# Patient Record
Sex: FEMALE | Race: WHITE | Employment: OTHER | URBAN - METROPOLITAN AREA
[De-identification: names, ages, dates, MRNs, and addresses within clinical notes are randomized per-mention and may not be internally consistent; named-entity substitution may affect disease eponyms.]

---

## 2017-08-09 ENCOUNTER — LAB ENCOUNTER (OUTPATIENT)
Dept: LAB | Age: 62
End: 2017-08-09
Attending: FAMILY MEDICINE
Payer: OTHER GOVERNMENT

## 2017-08-09 ENCOUNTER — OFFICE VISIT (OUTPATIENT)
Dept: FAMILY MEDICINE CLINIC | Facility: CLINIC | Age: 62
End: 2017-08-09

## 2017-08-09 VITALS
HEIGHT: 67 IN | DIASTOLIC BLOOD PRESSURE: 68 MMHG | HEART RATE: 58 BPM | SYSTOLIC BLOOD PRESSURE: 102 MMHG | WEIGHT: 171 LBS | TEMPERATURE: 98 F | BODY MASS INDEX: 26.84 KG/M2

## 2017-08-09 DIAGNOSIS — Z80.0 FH: COLON CANCER: ICD-10-CM

## 2017-08-09 DIAGNOSIS — R79.89 D-DIMER, ELEVATED: ICD-10-CM

## 2017-08-09 DIAGNOSIS — Z80.3 FH: BREAST CANCER: ICD-10-CM

## 2017-08-09 DIAGNOSIS — D18.03 LIVER HEMANGIOMA: ICD-10-CM

## 2017-08-09 DIAGNOSIS — Z90.710 HISTORY OF HYSTERECTOMY FOR BENIGN DISEASE: ICD-10-CM

## 2017-08-09 DIAGNOSIS — R79.89 D-DIMER, ELEVATED: Primary | ICD-10-CM

## 2017-08-09 LAB
ALBUMIN SERPL BCP-MCNC: 4.3 G/DL (ref 3.5–4.8)
ALBUMIN/GLOB SERPL: 1.7 {RATIO} (ref 1–2)
ALP SERPL-CCNC: 53 U/L (ref 32–100)
ALT SERPL-CCNC: 13 U/L (ref 14–54)
ANION GAP SERPL CALC-SCNC: 6 MMOL/L (ref 0–18)
AST SERPL-CCNC: 22 U/L (ref 15–41)
BASOPHILS # BLD: 0.1 K/UL (ref 0–0.2)
BASOPHILS NFR BLD: 1 %
BILIRUB SERPL-MCNC: 0.5 MG/DL (ref 0.3–1.2)
BUN SERPL-MCNC: 15 MG/DL (ref 8–20)
BUN/CREAT SERPL: 17.2 (ref 10–20)
CALCIUM SERPL-MCNC: 9.6 MG/DL (ref 8.5–10.5)
CHLORIDE SERPL-SCNC: 107 MMOL/L (ref 95–110)
CO2 SERPL-SCNC: 28 MMOL/L (ref 22–32)
CREAT SERPL-MCNC: 0.87 MG/DL (ref 0.5–1.5)
D DIMER PPP FEU-MCNC: 0.91 MCG/ML (ref ?–0.61)
EOSINOPHIL # BLD: 0.1 K/UL (ref 0–0.7)
EOSINOPHIL NFR BLD: 1 %
ERYTHROCYTE [DISTWIDTH] IN BLOOD BY AUTOMATED COUNT: 12.5 % (ref 11–15)
GLOBULIN PLAS-MCNC: 2.5 G/DL (ref 2.5–3.7)
GLUCOSE SERPL-MCNC: 95 MG/DL (ref 70–99)
HCT VFR BLD AUTO: 41.2 % (ref 35–48)
HGB BLD-MCNC: 13.8 G/DL (ref 12–16)
LYMPHOCYTES # BLD: 2.4 K/UL (ref 1–4)
LYMPHOCYTES NFR BLD: 37 %
MCH RBC QN AUTO: 29.9 PG (ref 27–32)
MCHC RBC AUTO-ENTMCNC: 33.4 G/DL (ref 32–37)
MCV RBC AUTO: 89.6 FL (ref 80–100)
MONOCYTES # BLD: 0.5 K/UL (ref 0–1)
MONOCYTES NFR BLD: 8 %
NEUTROPHILS # BLD AUTO: 3.6 K/UL (ref 1.8–7.7)
NEUTROPHILS NFR BLD: 53 %
OSMOLALITY UR CALC.SUM OF ELEC: 293 MOSM/KG (ref 275–295)
PLATELET # BLD AUTO: 182 K/UL (ref 140–400)
PMV BLD AUTO: 8.5 FL (ref 7.4–10.3)
POTASSIUM SERPL-SCNC: 4.8 MMOL/L (ref 3.3–5.1)
PROT SERPL-MCNC: 6.8 G/DL (ref 5.9–8.4)
RBC # BLD AUTO: 4.6 M/UL (ref 3.7–5.4)
SODIUM SERPL-SCNC: 141 MMOL/L (ref 136–144)
WBC # BLD AUTO: 6.7 K/UL (ref 4–11)

## 2017-08-09 PROCEDURE — 36415 COLL VENOUS BLD VENIPUNCTURE: CPT

## 2017-08-09 PROCEDURE — 80053 COMPREHEN METABOLIC PANEL: CPT

## 2017-08-09 PROCEDURE — 85025 COMPLETE CBC W/AUTO DIFF WBC: CPT

## 2017-08-09 PROCEDURE — 99203 OFFICE O/P NEW LOW 30 MIN: CPT | Performed by: FAMILY MEDICINE

## 2017-08-09 PROCEDURE — 99212 OFFICE O/P EST SF 10 MIN: CPT | Performed by: FAMILY MEDICINE

## 2017-08-09 PROCEDURE — 85379 FIBRIN DEGRADATION QUANT: CPT

## 2017-08-09 RX ORDER — MULTIVITAMIN
1 TABLET ORAL DAILY
Status: ON HOLD | COMMUNITY
End: 2019-12-24

## 2017-08-09 RX ORDER — ASPIRIN 81 MG/1
TABLET, CHEWABLE ORAL DAILY
COMMUNITY

## 2017-08-09 NOTE — PROGRESS NOTES
HPI:    Patient ID: Consuello Jeans is a 65year old female. HPI     Patient is here new to establish care. [place] ago from [place]. She has recently  about 4 years ago. She has a grown son who lives in [place]. Musculoskeletal: She exhibits no edema or tenderness. Skin: No rash noted.               ASSESSMENT/PLAN:   D-dimer, elevated  (primary encounter diagnosis)  Liver hemangioma  History of hysterectomy for benign disease  Fh: colon cancer  Fh: breast canc

## 2017-08-15 ENCOUNTER — TELEPHONE (OUTPATIENT)
Dept: INTERNAL MEDICINE CLINIC | Facility: CLINIC | Age: 62
End: 2017-08-15

## 2017-08-15 NOTE — TELEPHONE ENCOUNTER
----- Message from Zoila Rios MD sent at 8/11/2017 10:02 PM CDT -----  D dimer remains slightly elevated. Will refer to hematology (Dr Sissy Edward).  Need prior records from hematologist

## 2017-08-28 NOTE — TELEPHONE ENCOUNTER
Pt called, message per Dr given.   Verbalized understanding and compliance  Has appt pending 8/31/17

## 2017-08-31 ENCOUNTER — OFFICE VISIT (OUTPATIENT)
Dept: FAMILY MEDICINE CLINIC | Facility: CLINIC | Age: 62
End: 2017-08-31

## 2017-08-31 VITALS
DIASTOLIC BLOOD PRESSURE: 63 MMHG | HEART RATE: 69 BPM | BODY MASS INDEX: 27 KG/M2 | SYSTOLIC BLOOD PRESSURE: 97 MMHG | WEIGHT: 171 LBS

## 2017-08-31 DIAGNOSIS — Q74.0 ABNORMAL PROMINENCE OF CLAVICLE: Primary | ICD-10-CM

## 2017-08-31 DIAGNOSIS — L81.9 ATYPICAL PIGMENTED SKIN LESION: ICD-10-CM

## 2017-08-31 PROCEDURE — 99214 OFFICE O/P EST MOD 30 MIN: CPT | Performed by: FAMILY MEDICINE

## 2017-08-31 PROCEDURE — 99212 OFFICE O/P EST SF 10 MIN: CPT | Performed by: FAMILY MEDICINE

## 2017-08-31 NOTE — PROGRESS NOTES
HPI:    Patient ID: Enrico Castro is a 65year old female. HPI     Patient presents with:  Bump: Pt has a bump/lump on the chest/collar bone. Pt states she noticed it 1 wk ago. She denies pain . No breast mass  No injury  Does yoga.         Review

## 2017-09-01 ENCOUNTER — HOSPITAL ENCOUNTER (OUTPATIENT)
Dept: GENERAL RADIOLOGY | Facility: HOSPITAL | Age: 62
Discharge: HOME | End: 2017-09-01
Attending: FAMILY MEDICINE
Payer: OTHER GOVERNMENT

## 2017-09-01 DIAGNOSIS — Q74.0 ABNORMAL PROMINENCE OF CLAVICLE: ICD-10-CM

## 2017-09-01 PROCEDURE — 73000 X-RAY EXAM OF COLLAR BONE: CPT | Performed by: FAMILY MEDICINE

## 2017-10-23 ENCOUNTER — OFFICE VISIT (OUTPATIENT)
Dept: DERMATOLOGY CLINIC | Facility: CLINIC | Age: 62
End: 2017-10-23

## 2017-10-23 DIAGNOSIS — D23.4 BENIGN NEOPLASM OF SCALP AND SKIN OF NECK: ICD-10-CM

## 2017-10-23 DIAGNOSIS — L81.4 SOLAR LENTIGO: ICD-10-CM

## 2017-10-23 DIAGNOSIS — L57.8 SUN-DAMAGED SKIN: ICD-10-CM

## 2017-10-23 DIAGNOSIS — L82.1 SEBORRHEIC KERATOSES: Primary | ICD-10-CM

## 2017-10-23 DIAGNOSIS — D23.70 BENIGN NEOPLASM OF SKIN OF LOWER EXTREMITY, INCLUDING HIP, UNSPECIFIED LATERALITY: ICD-10-CM

## 2017-10-23 DIAGNOSIS — L60.9 DISEASE OF NAIL: ICD-10-CM

## 2017-10-23 DIAGNOSIS — D23.30 BENIGN NEOPLASM OF SKIN OF FACE: ICD-10-CM

## 2017-10-23 DIAGNOSIS — D23.60 BENIGN NEOPLASM OF SKIN OF UPPER EXTREMITY AND SHOULDER, UNSPECIFIED LATERALITY: ICD-10-CM

## 2017-10-23 DIAGNOSIS — D23.5 BENIGN NEOPLASM OF SKIN OF TRUNK, EXCEPT SCROTUM: ICD-10-CM

## 2017-10-23 DIAGNOSIS — L91.8 SKIN TAG: ICD-10-CM

## 2017-10-23 PROCEDURE — 99213 OFFICE O/P EST LOW 20 MIN: CPT | Performed by: DERMATOLOGY

## 2017-10-23 PROCEDURE — 99202 OFFICE O/P NEW SF 15 MIN: CPT | Performed by: DERMATOLOGY

## 2018-01-24 ENCOUNTER — OFFICE VISIT (OUTPATIENT)
Dept: INTERNAL MEDICINE CLINIC | Facility: CLINIC | Age: 63
End: 2018-01-24

## 2018-01-24 ENCOUNTER — NURSE TRIAGE (OUTPATIENT)
Dept: OTHER | Age: 63
End: 2018-01-24

## 2018-01-24 VITALS
HEIGHT: 67 IN | HEART RATE: 118 BPM | SYSTOLIC BLOOD PRESSURE: 117 MMHG | BODY MASS INDEX: 27.06 KG/M2 | WEIGHT: 172.38 LBS | TEMPERATURE: 98 F | DIASTOLIC BLOOD PRESSURE: 76 MMHG

## 2018-01-24 DIAGNOSIS — L03.032 INFECTION OF NAIL BED OF TOE OF LEFT FOOT: Primary | ICD-10-CM

## 2018-01-24 PROCEDURE — 99213 OFFICE O/P EST LOW 20 MIN: CPT | Performed by: INTERNAL MEDICINE

## 2018-01-24 PROCEDURE — 99212 OFFICE O/P EST SF 10 MIN: CPT | Performed by: INTERNAL MEDICINE

## 2018-01-24 RX ORDER — CLINDAMYCIN HYDROCHLORIDE 300 MG/1
300 CAPSULE ORAL 3 TIMES DAILY
Qty: 21 CAPSULE | Refills: 0 | Status: SHIPPED | OUTPATIENT
Start: 2018-01-24 | End: 2018-01-31

## 2018-01-24 NOTE — TELEPHONE ENCOUNTER
Action Requested: Summary for Provider     []  Critical Lab, Recommendations Needed  [] Need Additional Advice  [x]   FYI    []   Need Orders  [] Need Medications Sent to Pharmacy  []  Other     SUMMARY:  Left big toe nail fell off 5 days ago and since the

## 2018-01-24 NOTE — PROGRESS NOTES
HPI:    Patient ID: Erick Martínez is a 65year old female. HPI  Here for left big toenail infection. She reports her toenail fell off 5 days ago. She has history of onychomycosis of the toenail. It appears red, swollen and is painful.   There is no

## 2018-02-15 ENCOUNTER — OFFICE VISIT (OUTPATIENT)
Dept: PODIATRY CLINIC | Facility: CLINIC | Age: 63
End: 2018-02-15

## 2018-02-15 DIAGNOSIS — B35.1 ONYCHOMYCOSIS: Primary | ICD-10-CM

## 2018-02-15 PROCEDURE — 99202 OFFICE O/P NEW SF 15 MIN: CPT | Performed by: PODIATRIST

## 2018-02-15 PROCEDURE — 99213 OFFICE O/P EST LOW 20 MIN: CPT | Performed by: PODIATRIST

## 2018-02-15 NOTE — PROGRESS NOTES
HPI:    Patient ID: Lisandra Flores is a 65year old female. HPI  This 52-year-old female presents as a new patient to me and states that she is self-referred. She is extremely frustrated with nail changes that are progressive.   They have been this way

## 2018-02-26 ENCOUNTER — TELEPHONE (OUTPATIENT)
Dept: PODIATRY CLINIC | Facility: CLINIC | Age: 63
End: 2018-02-26

## 2018-02-26 NOTE — TELEPHONE ENCOUNTER
----- Message from Eneida Troy DPM sent at 2/26/2018 11:44 AM CST -----  Please call this patient and notify them of the need for an office visit to the review fungus culture results thank you

## 2018-04-19 ENCOUNTER — APPOINTMENT (OUTPATIENT)
Dept: LAB | Age: 63
End: 2018-04-19
Attending: PODIATRIST
Payer: OTHER GOVERNMENT

## 2018-04-19 ENCOUNTER — OFFICE VISIT (OUTPATIENT)
Dept: PODIATRY CLINIC | Facility: CLINIC | Age: 63
End: 2018-04-19

## 2018-04-19 DIAGNOSIS — B35.1 ONYCHOMYCOSIS: ICD-10-CM

## 2018-04-19 DIAGNOSIS — B35.1 ONYCHOMYCOSIS: Primary | ICD-10-CM

## 2018-04-19 PROCEDURE — 99212 OFFICE O/P EST SF 10 MIN: CPT | Performed by: PODIATRIST

## 2018-04-19 PROCEDURE — 36415 COLL VENOUS BLD VENIPUNCTURE: CPT

## 2018-04-19 PROCEDURE — 80076 HEPATIC FUNCTION PANEL: CPT

## 2018-04-19 RX ORDER — OMEPRAZOLE 20 MG/1
20 CAPSULE, DELAYED RELEASE ORAL
Status: ON HOLD | COMMUNITY
End: 2019-12-24

## 2018-04-19 NOTE — PROGRESS NOTES
HPI:    Patient ID: Lisandra Flores is a 65year old female. HPI  This 28-year-old female presents to discuss care associated with her fungus toenails. Fungal culture demonstrated thetricophyton issues.   Review of Systems  I reviewed medical status, me

## 2018-04-19 NOTE — TELEPHONE ENCOUNTER
Please sign Lamisil order      Call to Manda  Discussed her liver function tests and starting of Lamisil  Discussed taking Lamasil for 3 months  Discussed seeing Dr. Janis Mckinnon for  2 months  Verbalizes understanding.      [address]

## 2018-04-20 RX ORDER — TERBINAFINE HYDROCHLORIDE 250 MG/1
250 TABLET ORAL DAILY
Qty: 90 TABLET | Refills: 0 | Status: SHIPPED | OUTPATIENT
Start: 2018-04-20 | End: 2018-08-02 | Stop reason: ALTCHOICE

## 2018-08-02 ENCOUNTER — OFFICE VISIT (OUTPATIENT)
Dept: PODIATRY CLINIC | Facility: CLINIC | Age: 63
End: 2018-08-02
Payer: OTHER GOVERNMENT

## 2018-08-02 DIAGNOSIS — B35.1 ONYCHOMYCOSIS: Primary | ICD-10-CM

## 2018-08-02 PROCEDURE — 99212 OFFICE O/P EST SF 10 MIN: CPT | Performed by: PODIATRIST

## 2018-08-02 NOTE — PROGRESS NOTES
HPI:    Patient ID: Sarah Silva is a 59year old female. HPI  60-year-old female presents post oral Lamisil. Patient feels as though her nails are clear and have responded to treatment but not completely resolved.   She reports no ill effects with t

## 2019-10-02 ENCOUNTER — OFFICE VISIT (OUTPATIENT)
Dept: GASTROENTEROLOGY | Facility: CLINIC | Age: 64
End: 2019-10-02
Payer: OTHER GOVERNMENT

## 2019-10-02 ENCOUNTER — TELEPHONE (OUTPATIENT)
Dept: GASTROENTEROLOGY | Facility: CLINIC | Age: 64
End: 2019-10-02

## 2019-10-02 VITALS
WEIGHT: 166.38 LBS | SYSTOLIC BLOOD PRESSURE: 108 MMHG | DIASTOLIC BLOOD PRESSURE: 76 MMHG | BODY MASS INDEX: 26.11 KG/M2 | HEART RATE: 67 BPM | HEIGHT: 67 IN

## 2019-10-02 DIAGNOSIS — K59.04 CHRONIC IDIOPATHIC CONSTIPATION: ICD-10-CM

## 2019-10-02 DIAGNOSIS — Z12.11 COLON CANCER SCREENING: Primary | ICD-10-CM

## 2019-10-02 DIAGNOSIS — Z80.0 FAMILY HISTORY OF COLON CANCER REQUIRING SCREENING COLONOSCOPY: ICD-10-CM

## 2019-10-02 DIAGNOSIS — D18.03 LIVER HEMANGIOMA: Primary | ICD-10-CM

## 2019-10-02 DIAGNOSIS — Z80.0 FAMILY HISTORY OF COLON CANCER: ICD-10-CM

## 2019-10-02 PROCEDURE — 99203 OFFICE O/P NEW LOW 30 MIN: CPT | Performed by: INTERNAL MEDICINE

## 2019-10-02 RX ORDER — POLYETHYLENE GLYCOL 3350, SODIUM CHLORIDE, POTASSIUM CHLORIDE, SODIUM BICARBONATE, AND SODIUM SULFATE 240; 5.84; 2.98; 6.72; 22.72 G/4L; G/4L; G/4L; G/4L; G/4L
POWDER, FOR SOLUTION ORAL
Qty: 1 BOTTLE | Refills: 0 | Status: SHIPPED | OUTPATIENT
Start: 2019-10-02 | End: 2021-04-20

## 2019-10-02 NOTE — TELEPHONE ENCOUNTER
Scheduled for:  Colonoscopy 10681  Provider Name: Roseanna Stuart  Date:  12/24/19   Location:  [facility]  Sedation:  Mac  Time:  0730 Vidya Splinter 0630  Prep: Golytely  Meds/Allergies Reconciled?:  Physician reviewed  Diagnosis with codes:  Colon Cancer screening Z12.11 ,

## 2019-10-02 NOTE — PATIENT INSTRUCTIONS
1.  Continue MIRALAX, could also ALOE VERA natural laxative (from health food stores such as [employer] in [place]) or laxative teas or MILK OF MAGNESIA (laxative section of any pharmacy)    2.   Schedule colonoscopy exam at [facility]

## 2019-10-02 NOTE — PROGRESS NOTES
HPI:    Patient ID: Lorena Saenz is a 62year old woman overall healthy with recent minor concerns of a thrombosis event, kidney lesions, reported hepatic hemangiomas.     Ms. Tamia Ingram comes in today to discuss colonoscopy examination for her family histor Multiple Vitamin Oral Tab Take 1 tablet by mouth daily. Disp:  Rfl:      Allergies:  Penicillins             RASH  Streptomycin            RASH  Other                       Comment:levomicetine  Imaging: No results found.      PHYSICAL EXAM:   Physical Ex reports available for my review. Could bring the hepatic hemangiomas to our attention for further imaging and follow-up in our system if she wishes. No orders of the defined types were placed in this encounter.       Meds This Visit:  Requested Pres

## 2019-12-20 ENCOUNTER — TELEPHONE (OUTPATIENT)
Dept: GASTROENTEROLOGY | Facility: CLINIC | Age: 64
End: 2019-12-20

## 2019-12-20 NOTE — TELEPHONE ENCOUNTER
I spoke to the pt's     He wanted to verify the pt's appt date and time. He also wanted to go over the times to drink the prep.     I answered all of their questions and they verbalized understanding    6:30 am arrival time  First half of prep on

## 2019-12-24 ENCOUNTER — ANESTHESIA EVENT (OUTPATIENT)
Dept: ENDOSCOPY | Facility: HOSPITAL | Age: 64
End: 2019-12-24
Payer: OTHER GOVERNMENT

## 2019-12-24 ENCOUNTER — HOSPITAL ENCOUNTER (OUTPATIENT)
Facility: HOSPITAL | Age: 64
Setting detail: HOSPITAL OUTPATIENT SURGERY
Discharge: HOME | End: 2019-12-24
Attending: INTERNAL MEDICINE | Admitting: INTERNAL MEDICINE
Payer: OTHER GOVERNMENT

## 2019-12-24 ENCOUNTER — ANESTHESIA (OUTPATIENT)
Dept: ENDOSCOPY | Facility: HOSPITAL | Age: 64
End: 2019-12-24
Payer: OTHER GOVERNMENT

## 2019-12-24 DIAGNOSIS — Z80.0 FAMILY HISTORY OF COLON CANCER: ICD-10-CM

## 2019-12-24 DIAGNOSIS — Z12.11 COLON CANCER SCREENING: ICD-10-CM

## 2019-12-24 PROCEDURE — 0DBH8ZX EXCISION OF CECUM, VIA NATURAL OR ARTIFICIAL OPENING ENDOSCOPIC, DIAGNOSTIC: ICD-10-PCS | Performed by: INTERNAL MEDICINE

## 2019-12-24 PROCEDURE — 45380 COLONOSCOPY AND BIOPSY: CPT | Performed by: INTERNAL MEDICINE

## 2019-12-24 RX ORDER — MIDAZOLAM HYDROCHLORIDE 1 MG/ML
INJECTION INTRAMUSCULAR; INTRAVENOUS AS NEEDED
Status: DISCONTINUED | OUTPATIENT
Start: 2019-12-24 | End: 2019-12-24 | Stop reason: SURG

## 2019-12-24 RX ORDER — SODIUM CHLORIDE, SODIUM LACTATE, POTASSIUM CHLORIDE, CALCIUM CHLORIDE 600; 310; 30; 20 MG/100ML; MG/100ML; MG/100ML; MG/100ML
INJECTION, SOLUTION INTRAVENOUS CONTINUOUS
Status: DISCONTINUED | OUTPATIENT
Start: 2019-12-24 | End: 2019-12-24

## 2019-12-24 RX ORDER — LIDOCAINE HYDROCHLORIDE 10 MG/ML
INJECTION, SOLUTION EPIDURAL; INFILTRATION; INTRACAUDAL; PERINEURAL AS NEEDED
Status: DISCONTINUED | OUTPATIENT
Start: 2019-12-24 | End: 2019-12-24 | Stop reason: SURG

## 2019-12-24 RX ORDER — NALOXONE HYDROCHLORIDE 0.4 MG/ML
80 INJECTION, SOLUTION INTRAMUSCULAR; INTRAVENOUS; SUBCUTANEOUS AS NEEDED
Status: DISCONTINUED | OUTPATIENT
Start: 2019-12-24 | End: 2019-12-24

## 2019-12-24 RX ADMIN — LIDOCAINE HYDROCHLORIDE 20 MG: 10 INJECTION, SOLUTION EPIDURAL; INFILTRATION; INTRACAUDAL; PERINEURAL at 07:44:00

## 2019-12-24 RX ADMIN — MIDAZOLAM HYDROCHLORIDE 1 MG: 1 INJECTION INTRAMUSCULAR; INTRAVENOUS at 07:43:00

## 2019-12-24 RX ADMIN — SODIUM CHLORIDE, SODIUM LACTATE, POTASSIUM CHLORIDE, CALCIUM CHLORIDE: 600; 310; 30; 20 INJECTION, SOLUTION INTRAVENOUS at 08:24:00

## 2019-12-24 NOTE — OPERATIVE REPORT
COLONOSCOPY PROCEDURE REPORT     DATE OF PROCEDURE:  12/24/2019     PCP: Isabel Sprague. MD Bobbi     PREOPERATIVE DIAGNOSIS:  Screening colonoscopy examination     POSTOPERATIVE DIAGNOSIS:  See impression. SURGEON:  WOLFGANG Haynes Ivelisse:

## 2019-12-24 NOTE — H&P
History & Physical Examination    Patient Name: Jil Cary  MRN: [id number]  CSN: [id number]  YOB: 1955    Diagnosis: Screening colonoscopy    Present Illness:     62year old woman overall healthy with recent minor concerns of a thrombos Comment:levomicetine    Past Medical History:   Diagnosis Date   • Deep vein thrombosis (HCC)    • GERD (gastroesophageal reflux disease)    • Hiatal hernia    • High cholesterol    • Renal disorder    • Visual impairment     reading glassess     Past Surg

## 2019-12-24 NOTE — ANESTHESIA PREPROCEDURE EVALUATION
Anesthesia PreOp Note    HPI:     Nell Ulrich is a 62year old female who presents for preoperative consultation requested by: Mercedes Mills MD    Date of Surgery: 12/24/2019    Procedure(s):  COLONOSCOPY  Indication: Colon cancer screening Elodia, Gab Rod MD    No current [facility]-ordered outpatient medications on file.         Penicillins             RASH  Streptomycin            RASH  Other                       Comment:levomicetine    Family History   Problem Relation Age Asked        Reaction to local anesthetic: No    Social History Narrative      Not on file      Available pre-op labs reviewed. Vital Signs: Body mass index is 25.84 kg/m². height is 1.702 m (5' 7\") and weight is 74.8 kg (165 lb).  Her blood

## 2019-12-24 NOTE — ANESTHESIA POSTPROCEDURE EVALUATION
Patient: Zehra Rodríguez    Procedure Summary     Date:  12/24/19 Room / Location:  [facility] ENDOSCOPY 05 / [facility] ENDOSCOPY    Anesthesia Start:  4864 Anesthesia Stop:  0825    Procedure:  COLONOSCOPY (N/A ) Diagnosis:       Colon cancer screening      Family his

## 2019-12-26 VITALS
SYSTOLIC BLOOD PRESSURE: 103 MMHG | WEIGHT: 165 LBS | DIASTOLIC BLOOD PRESSURE: 67 MMHG | OXYGEN SATURATION: 99 % | HEIGHT: 67 IN | BODY MASS INDEX: 25.9 KG/M2 | RESPIRATION RATE: 12 BRPM | HEART RATE: 55 BPM

## 2020-01-06 ENCOUNTER — TELEPHONE (OUTPATIENT)
Dept: GASTROENTEROLOGY | Facility: CLINIC | Age: 65
End: 2020-01-06

## 2020-01-06 NOTE — TELEPHONE ENCOUNTER
Steve Navarro MD  [facility]             GI RNs - please print and mail this letter to pt; recall for colonoscopy exam in 5yrs      Letter mailed to pt and recall for repeat Colon in 5 yrs,12/24/24 per

## 2021-04-14 ENCOUNTER — PATIENT MESSAGE (OUTPATIENT)
Dept: FAMILY MEDICINE CLINIC | Facility: CLINIC | Age: 66
End: 2021-04-14

## 2021-04-14 ENCOUNTER — TELEPHONE (OUTPATIENT)
Dept: FAMILY MEDICINE CLINIC | Facility: CLINIC | Age: 66
End: 2021-04-14

## 2021-04-14 NOTE — TELEPHONE ENCOUNTER
Left message to call back     Patient has read [employer] message    RE: Non-Urgent Medical Question  Message [id number]  From   Jackie Herman RN To   [employer] Del[employer]   4/14/2021  4:56 PM   Last Read in [address]   4/14/2021  4:57 PM by Saúl

## 2021-04-14 NOTE — TELEPHONE ENCOUNTER
From: Vikki Lofton  To: Balaji Gurrola MD  Sent: 4/14/2021 4:45 PM CDT  Subject: Non-Urgent Medical Question    I would like to get a Ultrasound of my viens,because I had a blood clot. I would like to be tested.

## 2021-04-14 NOTE — TELEPHONE ENCOUNTER
----- Message from [address] sent at 4/14/2021  4:45 PM CDT -----  Regarding: Non-Urgent Medical Question  Contact: [phone]  I would like to get a Ultrasound of my viens,because I had a blood clot. I would like to be tested.

## 2021-04-15 ENCOUNTER — TELEPHONE (OUTPATIENT)
Dept: FAMILY MEDICINE CLINIC | Facility: CLINIC | Age: 66
End: 2021-04-15

## 2021-04-15 NOTE — TELEPHONE ENCOUNTER
[facility] but would like to re-establish care with Dr Glenn Carr.  Reports had clots and has been on Pradaxa for 4 months, wants to discuss if she should continue treatment, reports is no longer seeing the provider from the

## 2021-04-20 ENCOUNTER — OFFICE VISIT (OUTPATIENT)
Dept: FAMILY MEDICINE CLINIC | Facility: CLINIC | Age: 66
End: 2021-04-20
Payer: OTHER GOVERNMENT

## 2021-04-20 VITALS
DIASTOLIC BLOOD PRESSURE: 78 MMHG | WEIGHT: 171 LBS | HEIGHT: 67 IN | SYSTOLIC BLOOD PRESSURE: 114 MMHG | BODY MASS INDEX: 26.84 KG/M2 | HEART RATE: 64 BPM | OXYGEN SATURATION: 96 % | TEMPERATURE: 99 F

## 2021-04-20 DIAGNOSIS — Z00.00 WELL ADULT EXAM: ICD-10-CM

## 2021-04-20 DIAGNOSIS — M25.512 LEFT SHOULDER PAIN, UNSPECIFIED CHRONICITY: ICD-10-CM

## 2021-04-20 DIAGNOSIS — Z86.69 HISTORY OF GLAUCOMA: ICD-10-CM

## 2021-04-20 DIAGNOSIS — D18.03 LIVER HEMANGIOMA: Primary | ICD-10-CM

## 2021-04-20 DIAGNOSIS — Z13.21 ENCOUNTER FOR VITAMIN DEFICIENCY SCREENING: ICD-10-CM

## 2021-04-20 DIAGNOSIS — M85.80 OSTEOPENIA, UNSPECIFIED LOCATION: ICD-10-CM

## 2021-04-20 DIAGNOSIS — R39.198 DECREASED URINE STREAM: ICD-10-CM

## 2021-04-20 DIAGNOSIS — R79.89 D-DIMER, ELEVATED: ICD-10-CM

## 2021-04-20 DIAGNOSIS — Z86.718 HISTORY OF RECURRENT DEEP VEIN THROMBOSIS: ICD-10-CM

## 2021-04-20 DIAGNOSIS — R07.89 CHEST TIGHTNESS: ICD-10-CM

## 2021-04-20 PROCEDURE — 3074F SYST BP LT 130 MM HG: CPT | Performed by: FAMILY MEDICINE

## 2021-04-20 PROCEDURE — 3008F BODY MASS INDEX DOCD: CPT | Performed by: FAMILY MEDICINE

## 2021-04-20 PROCEDURE — 99203 OFFICE O/P NEW LOW 30 MIN: CPT | Performed by: FAMILY MEDICINE

## 2021-04-20 PROCEDURE — 3078F DIAST BP <80 MM HG: CPT | Performed by: FAMILY MEDICINE

## 2021-04-20 PROCEDURE — 81002 URINALYSIS NONAUTO W/O SCOPE: CPT | Performed by: FAMILY MEDICINE

## 2021-04-20 RX ORDER — FAMOTIDINE 20 MG/1
20 TABLET ORAL 2 TIMES DAILY
COMMUNITY

## 2021-04-20 NOTE — PROGRESS NOTES
HPI:    Patient ID: Renetta Smith is a 60year old female.     HPI  Patient presents with:  Slow Urine Flow: pt states he has noticed a decreased in her urine   Glaucoma  Other: Would like to check her D-Dimer levels pt states she had a blod clot four mon light.   Neck:      Thyroid: No thyromegaly. Cardiovascular:      Rate and Rhythm: Normal rate and regular rhythm. Heart sounds: No murmur heard. Pulmonary:      Effort: Pulmonary effort is normal.      Breath sounds: Normal breath sounds.  No wh screening    - VITAMIN D, 25-HYDROXY; Future    10. Chest tightness    - EKG 12-LEAD;  Future      Orders Placed This Encounter      CBC W Differential W Platelet [E]      Comp Metabolic Panel (14) [E]      Lipid Panel [E]      TSH W Reflex To Free T4 [E]

## 2021-04-21 ENCOUNTER — LAB ENCOUNTER (OUTPATIENT)
Dept: LAB | Age: 66
End: 2021-04-21
Attending: FAMILY MEDICINE
Payer: OTHER GOVERNMENT

## 2021-04-21 DIAGNOSIS — Z00.00 WELL ADULT EXAM: ICD-10-CM

## 2021-04-21 DIAGNOSIS — Z13.21 ENCOUNTER FOR VITAMIN DEFICIENCY SCREENING: ICD-10-CM

## 2021-04-21 PROCEDURE — 36415 COLL VENOUS BLD VENIPUNCTURE: CPT

## 2021-04-21 PROCEDURE — 82306 VITAMIN D 25 HYDROXY: CPT

## 2021-04-21 PROCEDURE — 84443 ASSAY THYROID STIM HORMONE: CPT

## 2021-04-21 PROCEDURE — 80053 COMPREHEN METABOLIC PANEL: CPT

## 2021-04-21 PROCEDURE — 80061 LIPID PANEL: CPT

## 2021-04-21 PROCEDURE — 85025 COMPLETE CBC W/AUTO DIFF WBC: CPT

## 2021-05-03 ENCOUNTER — TELEPHONE (OUTPATIENT)
Dept: PHYSICAL THERAPY | Facility: HOSPITAL | Age: 66
End: 2021-05-03

## 2021-05-05 ENCOUNTER — APPOINTMENT (OUTPATIENT)
Dept: PHYSICAL THERAPY | Age: 66
End: 2021-05-05
Attending: FAMILY MEDICINE
Payer: OTHER GOVERNMENT

## 2021-05-07 ENCOUNTER — APPOINTMENT (OUTPATIENT)
Dept: PHYSICAL THERAPY | Age: 66
End: 2021-05-07
Attending: FAMILY MEDICINE
Payer: OTHER GOVERNMENT

## 2021-05-10 ENCOUNTER — APPOINTMENT (OUTPATIENT)
Dept: PHYSICAL THERAPY | Age: 66
End: 2021-05-10
Attending: FAMILY MEDICINE
Payer: OTHER GOVERNMENT

## 2021-05-12 ENCOUNTER — APPOINTMENT (OUTPATIENT)
Dept: PHYSICAL THERAPY | Age: 66
End: 2021-05-12
Attending: FAMILY MEDICINE
Payer: OTHER GOVERNMENT

## 2021-05-14 ENCOUNTER — OFFICE VISIT (OUTPATIENT)
Dept: HEMATOLOGY/ONCOLOGY | Facility: HOSPITAL | Age: 66
End: 2021-05-14
Attending: INTERNAL MEDICINE
Payer: MEDICARE

## 2021-05-14 VITALS
OXYGEN SATURATION: 97 % | BODY MASS INDEX: 26.68 KG/M2 | TEMPERATURE: 99 F | RESPIRATION RATE: 16 BRPM | DIASTOLIC BLOOD PRESSURE: 78 MMHG | SYSTOLIC BLOOD PRESSURE: 109 MMHG | HEART RATE: 63 BPM | HEIGHT: 67 IN | WEIGHT: 170 LBS

## 2021-05-14 DIAGNOSIS — R79.89 D-DIMER, ELEVATED: ICD-10-CM

## 2021-05-14 DIAGNOSIS — I87.009 POST-PHLEBITIC SYNDROME: ICD-10-CM

## 2021-05-14 DIAGNOSIS — I82.409 DEEP VEIN THROMBOSIS (DVT) OF LOWER EXTREMITY, UNSPECIFIED CHRONICITY, UNSPECIFIED LATERALITY, UNSPECIFIED VEIN (HCC): Primary | ICD-10-CM

## 2021-05-14 PROCEDURE — 99243 OFF/OP CNSLTJ NEW/EST LOW 30: CPT | Performed by: INTERNAL MEDICINE

## 2021-05-14 RX ORDER — ATORVASTATIN CALCIUM 20 MG/1
20 TABLET, FILM COATED ORAL NIGHTLY
COMMUNITY

## 2021-05-14 NOTE — CONSULTS
HPI     Fay Barth is a 73year old female here at the request of Gris Martines MD for evaluation of Deep vein thrombosis (dvt) of lower extremity, unspecified chronicity, unspecified laterality, unspecified vein (hcc)  (primary encounter diagnosis) recent DVT. By then, she was on Pradaxa 5 months. She called her doctor in [facility] and told she can stop treatment. [facility] [place] told she can stop too. She had a fall 2 weeks ago and fractured her L wrist.  Has a cast now.       She still has pain in t COLONOSCOPY;  Surgeon: Dalia Payton MD;  Location: [address] [place] [address] ENDOSCOPY   • EGD      [place]      fibroid   •      • OTHER SURGICAL HISTORY      one ovary removed/ cyst     Social History    Tobacco Use      Sm the liver. Orders Placed This Encounter      D-Dimer    Return in about 4 weeks (around 6/11/2021) for follow-up, with labs. MdMMDM  Results From Past 48 Hours:  No results found for this or any previous visit (from the past 48 hour(s)).       Im

## 2021-05-17 ENCOUNTER — HOSPITAL ENCOUNTER (OUTPATIENT)
Dept: ULTRASOUND IMAGING | Facility: HOSPITAL | Age: 66
Discharge: HOME | End: 2021-05-17
Attending: INTERNAL MEDICINE
Payer: MEDICARE

## 2021-05-17 ENCOUNTER — TELEPHONE (OUTPATIENT)
Dept: HEMATOLOGY/ONCOLOGY | Facility: HOSPITAL | Age: 66
End: 2021-05-17

## 2021-05-17 ENCOUNTER — APPOINTMENT (OUTPATIENT)
Dept: PHYSICAL THERAPY | Age: 66
End: 2021-05-17
Attending: FAMILY MEDICINE
Payer: OTHER GOVERNMENT

## 2021-05-17 DIAGNOSIS — R79.89 D-DIMER, ELEVATED: ICD-10-CM

## 2021-05-17 DIAGNOSIS — I82.409 DEEP VEIN THROMBOSIS (DVT) OF LOWER EXTREMITY, UNSPECIFIED CHRONICITY, UNSPECIFIED LATERALITY, UNSPECIFIED VEIN (HCC): ICD-10-CM

## 2021-05-17 PROCEDURE — 93970 EXTREMITY STUDY: CPT | Performed by: INTERNAL MEDICINE

## 2021-05-17 NOTE — TELEPHONE ENCOUNTER
Patient called and asked if you would call in  [facility].     [address], [address] [facility], [phone]

## 2021-05-18 ENCOUNTER — TELEPHONE (OUTPATIENT)
Dept: HEMATOLOGY/ONCOLOGY | Facility: HOSPITAL | Age: 66
End: 2021-05-18

## 2021-05-18 NOTE — TELEPHONE ENCOUNTER
Pt called and left message to please call back office regarding discussing possible patient assistance programs regarding assistance with Pradaxa [phone].

## 2021-05-18 NOTE — TELEPHONE ENCOUNTER
I spoke with Patient. Patient agreed that she gave me wrong name of drug. The correct name of the medication is Pradaxa.  Pharmacy called her and told her the price t was 500.00, the patient is not going to pursue taking this medication because of the cost.

## 2021-05-25 ENCOUNTER — APPOINTMENT (OUTPATIENT)
Dept: PHYSICAL THERAPY | Age: 66
End: 2021-05-25
Attending: FAMILY MEDICINE
Payer: OTHER GOVERNMENT

## 2021-05-27 ENCOUNTER — APPOINTMENT (OUTPATIENT)
Dept: PHYSICAL THERAPY | Age: 66
End: 2021-05-27
Attending: FAMILY MEDICINE
Payer: OTHER GOVERNMENT

## 2021-06-01 ENCOUNTER — APPOINTMENT (OUTPATIENT)
Dept: PHYSICAL THERAPY | Age: 66
End: 2021-06-01
Attending: FAMILY MEDICINE
Payer: OTHER GOVERNMENT

## 2021-06-15 ENCOUNTER — LAB ENCOUNTER (OUTPATIENT)
Dept: LAB | Age: 66
End: 2021-06-15
Attending: FAMILY MEDICINE
Payer: MEDICARE

## 2021-06-15 DIAGNOSIS — R79.89 D-DIMER, ELEVATED: ICD-10-CM

## 2021-06-15 DIAGNOSIS — I82.409 DEEP VEIN THROMBOSIS (DVT) OF LOWER EXTREMITY, UNSPECIFIED CHRONICITY, UNSPECIFIED LATERALITY, UNSPECIFIED VEIN (HCC): ICD-10-CM

## 2021-06-15 PROCEDURE — 85379 FIBRIN DEGRADATION QUANT: CPT

## 2021-06-15 PROCEDURE — 36415 COLL VENOUS BLD VENIPUNCTURE: CPT

## 2021-06-16 ENCOUNTER — TELEPHONE (OUTPATIENT)
Dept: FAMILY MEDICINE CLINIC | Facility: CLINIC | Age: 66
End: 2021-06-16

## 2021-06-16 ENCOUNTER — PATIENT MESSAGE (OUTPATIENT)
Dept: FAMILY MEDICINE CLINIC | Facility: CLINIC | Age: 66
End: 2021-06-16

## 2021-06-16 NOTE — TELEPHONE ENCOUNTER
Action Requested: Summary for Provider     []  Critical Lab, Recommendations Needed  [] Need Additional Advice  []   FYI    []   Need Orders  [] Need Medications Sent to Pharmacy  []  Other     SUMMARY: OV scheduled 6/17/21 collette Cm for eval left should

## 2021-06-16 NOTE — TELEPHONE ENCOUNTER
See Acute Telephone Encounter. Closing this Encounter. Responded to patient via [employer]. From: Yolanda Guzman  To: Beulah Mead MD  Sent: 6/16/2021  2:54 PM CDT  Subject: Referral Request    Hello.  I have broken a bone and my wrist and had a cast

## 2021-06-16 NOTE — TELEPHONE ENCOUNTER
----- Message from [address] [place] [address] sent at 6/16/2021  2:54 PM CDT -----  Regarding: Referral Request  Contact: [phone]  Hello. I have broken a bone and my wrist and had a cast from April 30 upto May 11.  All this time I have a strong pain in shoulder a

## 2021-06-17 ENCOUNTER — OFFICE VISIT (OUTPATIENT)
Dept: FAMILY MEDICINE CLINIC | Facility: CLINIC | Age: 66
End: 2021-06-17
Payer: MEDICARE

## 2021-06-17 ENCOUNTER — LAB ENCOUNTER (OUTPATIENT)
Dept: LAB | Age: 66
End: 2021-06-17
Attending: FAMILY MEDICINE
Payer: MEDICARE

## 2021-06-17 VITALS
HEART RATE: 73 BPM | WEIGHT: 172 LBS | TEMPERATURE: 97 F | DIASTOLIC BLOOD PRESSURE: 78 MMHG | BODY MASS INDEX: 27 KG/M2 | SYSTOLIC BLOOD PRESSURE: 114 MMHG | HEIGHT: 67 IN

## 2021-06-17 DIAGNOSIS — Z86.718 HISTORY OF RECURRENT DEEP VEIN THROMBOSIS: ICD-10-CM

## 2021-06-17 DIAGNOSIS — R07.9 LEFT-SIDED CHEST PAIN: ICD-10-CM

## 2021-06-17 DIAGNOSIS — S62.102D CLOSED FRACTURE OF LEFT WRIST WITH ROUTINE HEALING, SUBSEQUENT ENCOUNTER: ICD-10-CM

## 2021-06-17 DIAGNOSIS — Z78.0 POSTMENOPAUSAL: ICD-10-CM

## 2021-06-17 DIAGNOSIS — M25.512 ACUTE PAIN OF LEFT SHOULDER: Primary | ICD-10-CM

## 2021-06-17 PROCEDURE — 99214 OFFICE O/P EST MOD 30 MIN: CPT | Performed by: FAMILY MEDICINE

## 2021-06-17 PROCEDURE — 93010 ELECTROCARDIOGRAM REPORT: CPT | Performed by: FAMILY MEDICINE

## 2021-06-17 PROCEDURE — 93005 ELECTROCARDIOGRAM TRACING: CPT

## 2021-06-17 RX ORDER — HYDROCODONE BITARTRATE AND ACETAMINOPHEN 5; 325 MG/1; MG/1
TABLET ORAL
COMMUNITY
Start: 2021-04-30 | End: 2021-06-17

## 2021-06-17 RX ORDER — HYDROCODONE BITARTRATE AND ACETAMINOPHEN 5; 325 MG/1; MG/1
1 TABLET ORAL NIGHTLY PRN
Qty: 20 TABLET | Refills: 0 | Status: SHIPPED | OUTPATIENT
Start: 2021-06-17

## 2021-06-17 NOTE — PROGRESS NOTES
HPI:    Patient ID: Ana Paula Meneses is a 73year old female.     HPI  Patient presents with:  Arm Pain: left arm and left elbow pain  X 6 weeks getting worst pain radiates to chest pt broke her left  wrist in April 2021    Patient here with complains of see Current Outpatient Medications   Medication Sig Dispense Refill   • HYDROcodone-acetaminophen (NORCO) 5-325 MG Oral Tab Take 1 tablet by mouth nightly as needed for Pain.  20 tablet 0   • Dabigatran Etexilate Mesylate (PRADAXA) 150 MG Oral Cap Take 1 caps something for pain  Tearful  Pain medication given  Follow up orthopedics   Avoid nsaids with blood thinner  - HYDROcodone-acetaminophen (NORCO) 5-325 MG Oral Tab; Take 1 tablet by mouth nightly as needed for Pain. Dispense: 20 tablet;  Refill: 0  - ORTHOP

## 2021-06-19 DIAGNOSIS — R07.9 LEFT-SIDED CHEST PAIN: Primary | ICD-10-CM

## 2021-06-24 ENCOUNTER — TELEPHONE (OUTPATIENT)
Dept: FAMILY MEDICINE CLINIC | Facility: CLINIC | Age: 66
End: 2021-06-24

## 2021-06-24 NOTE — TELEPHONE ENCOUNTER
There is no replacement for her chest x-ray however she may let the technician know to see if they can work with her and help her

## 2021-06-24 NOTE — TELEPHONE ENCOUNTER
Pt has an appt with cardiologist on 9/27 and has to do a xray of chest. Pt said due to both hands hurting she is not able to lift her shoulders up and wondered if she can do an ultrasound or something instead of xray of chest. Please advise

## 2021-06-25 ENCOUNTER — HOSPITAL ENCOUNTER (OUTPATIENT)
Dept: BONE DENSITY | Age: 66
Discharge: HOME | End: 2021-06-25
Attending: FAMILY MEDICINE
Payer: MEDICARE

## 2021-06-25 DIAGNOSIS — Z78.0 POSTMENOPAUSAL: ICD-10-CM

## 2021-06-25 PROCEDURE — 77080 DXA BONE DENSITY AXIAL: CPT | Performed by: FAMILY MEDICINE

## 2021-06-29 ENCOUNTER — PATIENT MESSAGE (OUTPATIENT)
Dept: FAMILY MEDICINE CLINIC | Facility: CLINIC | Age: 66
End: 2021-06-29

## 2021-06-29 DIAGNOSIS — M25.512 ACUTE PAIN OF LEFT SHOULDER: ICD-10-CM

## 2021-06-30 NOTE — TELEPHONE ENCOUNTER
From: Erick Martínez  To: Margie Bingham MD  Sent: 6/29/2021 5:45 PM CDT  Subject: Prescription Question    Hello ,thanks for message!  We need a prescription for 30 days ,if I have to take pills longer,I will need a prescription to send to [facility] and they

## 2021-07-01 ENCOUNTER — TELEPHONE (OUTPATIENT)
Dept: FAMILY MEDICINE CLINIC | Facility: CLINIC | Age: 66
End: 2021-07-01

## 2021-07-01 DIAGNOSIS — M85.80 OSTEOPENIA, UNSPECIFIED LOCATION: Primary | ICD-10-CM

## 2021-07-01 RX ORDER — ALENDRONATE SODIUM 70 MG/1
70 TABLET ORAL WEEKLY
Qty: 12 TABLET | Refills: 3 | Status: SHIPPED | OUTPATIENT
Start: 2021-07-01

## 2021-07-01 NOTE — TELEPHONE ENCOUNTER
Dr. Prudy Peabody, patient called back regarding the medication. Patient would like to proceed with medication. Patient would like a 90 day supply to mail order pharmacy.      Hernandez Eduardo MD   6/29/2021  3:31 PM CDT       Bone density shows osteopenia. David Gramajo hist

## 2021-07-01 NOTE — TELEPHONE ENCOUNTER
See encounter 6/30/21, this encounter closed.      RE: Prescription Question  Message [id number]  From   Eric Orozco RN To   [employer]   6/30/2021  9:56 AM   Last Read in [employer]   6/30/2021  4:09 PM by Joseph Rodríguez

## 2021-07-01 NOTE — TELEPHONE ENCOUNTER
Patient informed of provider's response/orders as per below and voiced understating and agrees with all.

## 2021-07-01 NOTE — TELEPHONE ENCOUNTER
Meds already sent in   Please take medication once a week, please review directions with patient again  Please take medication in am, empty stomach, with full glass of water, no eating for 30mins after, avoid laying for 30 mins after

## 2021-07-06 ENCOUNTER — OFFICE VISIT (OUTPATIENT)
Dept: HEMATOLOGY/ONCOLOGY | Facility: HOSPITAL | Age: 66
End: 2021-07-06
Attending: INTERNAL MEDICINE
Payer: MEDICARE

## 2021-07-06 VITALS
RESPIRATION RATE: 16 BRPM | DIASTOLIC BLOOD PRESSURE: 64 MMHG | WEIGHT: 172 LBS | TEMPERATURE: 99 F | BODY MASS INDEX: 27 KG/M2 | HEART RATE: 62 BPM | OXYGEN SATURATION: 98 % | SYSTOLIC BLOOD PRESSURE: 106 MMHG | HEIGHT: 67 IN

## 2021-07-06 DIAGNOSIS — R79.89 D-DIMER, ELEVATED: ICD-10-CM

## 2021-07-06 DIAGNOSIS — Z86.718 HISTORY OF RECURRENT DEEP VEIN THROMBOSIS: Primary | ICD-10-CM

## 2021-07-06 PROCEDURE — 99213 OFFICE O/P EST LOW 20 MIN: CPT | Performed by: INTERNAL MEDICINE

## 2021-07-06 NOTE — PROGRESS NOTES
SARAHI Kirk is a 73year old female here for follow up of History of recurrent deep vein thrombosis  (primary encounter diagnosis)  D-dimer, elevated      Stopped pradaxa 3 weeks after US done on 5/17/21.       States that she has been having [facility]   • COLONOSCOPY N/A 2019    Procedure: COLONOSCOPY;  Surgeon: Qing Almeida MD;  Location: [address] [place] [address] ENDOSCOPY   • EGD      [facility]   • HYSTERECTOMY      fibroid   •      • OTHER SURGICAL HISTORY      one o hematology recommended. No orders of the defined types were placed in this encounter. No follow-ups on file. MDM low    Results From Past 48 Hours:  No results found for this or any previous visit (from the past 48 hour(s)).       Imaging & Ref

## 2021-07-26 ENCOUNTER — HOSPITAL ENCOUNTER (OUTPATIENT)
Dept: GENERAL RADIOLOGY | Age: 66
Discharge: HOME | End: 2021-07-26
Attending: FAMILY MEDICINE
Payer: MEDICARE

## 2021-07-26 DIAGNOSIS — R07.9 LEFT-SIDED CHEST PAIN: ICD-10-CM

## 2021-07-26 PROCEDURE — 71046 X-RAY EXAM CHEST 2 VIEWS: CPT | Performed by: FAMILY MEDICINE

## 2021-09-21 ENCOUNTER — OFFICE VISIT (OUTPATIENT)
Dept: DERMATOLOGY CLINIC | Facility: CLINIC | Age: 66
End: 2021-09-21
Payer: MEDICARE

## 2021-09-21 DIAGNOSIS — L08.9 SKIN INFLAMMATION: Primary | ICD-10-CM

## 2021-09-21 PROCEDURE — 99202 OFFICE O/P NEW SF 15 MIN: CPT | Performed by: DERMATOLOGY

## 2021-09-21 RX ORDER — FLUOCINONIDE 0.5 MG/G
OINTMENT TOPICAL
Qty: 60 G | Refills: 2 | Status: SHIPPED | OUTPATIENT
Start: 2021-09-21

## 2021-09-21 NOTE — PROGRESS NOTES
HPI:     Chief Complaint     Derm Problem        HPI     Derm Problem      Additional comments: pt has skin issue with LT palm of hand since having cast removed, denies personal and family hX of skin cancer LOV 10/23/2017           Last edited by POKKT, disease)    • Hiatal hernia    • High cholesterol    • Renal disorder    • Visual impairment     reading glassess     Past Surgical History:   Procedure Laterality Date   • COLONOSCOPY  2014    [facility]   • COLONOSCOPY N/A 12/24/2019    Procedure: CO file      Frequency of Social Gatherings with Friends and Family: Not on file      Attends [employer] Services: Not on file      Active Member of Clubs or Organizations: Not on file      Attends Club or Organization Meetings: Not on file      Marital Status Orders:  No orders of the defined types were placed in this encounter.         9/21/2021  Sissy Lackey MD

## 2021-09-27 ENCOUNTER — OFFICE VISIT (OUTPATIENT)
Dept: CARDIOLOGY CLINIC | Facility: CLINIC | Age: 66
End: 2021-09-27
Payer: MEDICARE

## 2021-09-27 VITALS
HEIGHT: 67 IN | BODY MASS INDEX: 27.47 KG/M2 | SYSTOLIC BLOOD PRESSURE: 110 MMHG | DIASTOLIC BLOOD PRESSURE: 73 MMHG | WEIGHT: 175 LBS | HEART RATE: 62 BPM

## 2021-09-27 DIAGNOSIS — R07.89 CHEST TIGHTNESS: ICD-10-CM

## 2021-09-27 DIAGNOSIS — Z71.9 ENCOUNTER FOR CONSULTATION: Primary | ICD-10-CM

## 2021-09-27 DIAGNOSIS — I82.409 DEEP VEIN THROMBOSIS (DVT) OF LOWER EXTREMITY, UNSPECIFIED CHRONICITY, UNSPECIFIED LATERALITY, UNSPECIFIED VEIN (HCC): ICD-10-CM

## 2021-09-27 PROCEDURE — 93000 ELECTROCARDIOGRAM COMPLETE: CPT | Performed by: INTERNAL MEDICINE

## 2021-09-27 PROCEDURE — 99205 OFFICE O/P NEW HI 60 MIN: CPT | Performed by: INTERNAL MEDICINE

## 2021-09-27 NOTE — PATIENT INSTRUCTIONS
We will seek approval for cardiac CT angiogram.  If approved nurse will contact you and get your prescription for the metoprolol tablets as we discussed.

## 2021-09-27 NOTE — PROGRESS NOTES
Name:  Sarah Silva  : 1955    Date of consultation:   2021    Referring physician: Angel Grullon MD    Reason for Visit:  Patient presents with:  Consult: EKG  21  Chest Pain: 3 times this month lasting 20 min      HPI:   This is a 73 Visual impairment     reading glassess      Social History:  Social History    Tobacco Use      Smoking status: Never Smoker      Smokeless tobacco: Never Used    Vaping Use      Vaping Use: Never used    Alcohol use: No    Drug use: No       ROS:   GENERA 04/21/2021        ASSESSMENT AND PLAN   1. Encounter for consultation  EKG is normal  - EKG FOR INITIAL PREVENT EXAM  - ELECTROCARDIOGRAM, COMPLETE    2. Chest tightness  Suspicious for ischemia.   I think the most accurate test at this point time via cardi

## 2021-09-30 ENCOUNTER — ORDER TRANSCRIPTION (OUTPATIENT)
Dept: ADMINISTRATIVE | Facility: HOSPITAL | Age: 66
End: 2021-09-30

## 2021-09-30 DIAGNOSIS — R07.89 CHEST TIGHTNESS: Primary | ICD-10-CM

## 2021-10-15 ENCOUNTER — TELEPHONE (OUTPATIENT)
Dept: CARDIOLOGY CLINIC | Facility: CLINIC | Age: 66
End: 2021-10-15

## 2021-10-15 NOTE — TELEPHONE ENCOUNTER
Pts  called to speak to RN about any premedication that pt might need before CTA scheduled for next Tuesday. Please call.

## 2021-10-15 NOTE — TELEPHONE ENCOUNTER
Called Manda back, advised her to take metoprolol 25 mg night before the CTA and another dose the morning of the test. Metoprolol dose verified with  in absence of , her pulse rate is between 62-63-64,one time was 73.  Prescription sent t

## 2021-10-19 ENCOUNTER — HOSPITAL ENCOUNTER (OUTPATIENT)
Dept: CT IMAGING | Facility: HOSPITAL | Age: 66
Discharge: HOME | End: 2021-10-19
Attending: INTERNAL MEDICINE
Payer: MEDICARE

## 2021-10-19 ENCOUNTER — TELEPHONE (OUTPATIENT)
Dept: CARDIOLOGY CLINIC | Facility: CLINIC | Age: 66
End: 2021-10-19

## 2021-10-19 VITALS — WEIGHT: 170 LBS | HEIGHT: 67 IN | BODY MASS INDEX: 26.68 KG/M2

## 2021-10-19 DIAGNOSIS — R07.89 CHEST TIGHTNESS: ICD-10-CM

## 2021-10-19 PROCEDURE — 75574 CT ANGIO HRT W/3D IMAGE: CPT | Performed by: INTERNAL MEDICINE

## 2021-10-19 PROCEDURE — 82565 ASSAY OF CREATININE: CPT

## 2021-10-19 RX ORDER — DILTIAZEM HYDROCHLORIDE 5 MG/ML
5 INJECTION INTRAVENOUS SEE ADMIN INSTRUCTIONS
Status: DISCONTINUED | OUTPATIENT
Start: 2021-10-19 | End: 2021-10-21

## 2021-10-19 RX ORDER — METOPROLOL TARTRATE 5 MG/5ML
5 INJECTION INTRAVENOUS SEE ADMIN INSTRUCTIONS
Status: DISCONTINUED | OUTPATIENT
Start: 2021-10-19 | End: 2021-10-21

## 2021-10-19 RX ORDER — METOPROLOL TARTRATE 5 MG/5ML
INJECTION INTRAVENOUS
Status: DISCONTINUED
Start: 2021-10-19 | End: 2021-10-19 | Stop reason: WASHOUT

## 2021-10-19 RX ORDER — NITROGLYCERIN 0.4 MG/1
0.4 TABLET SUBLINGUAL ONCE
Status: COMPLETED | OUTPATIENT
Start: 2021-10-19 | End: 2021-10-19

## 2021-10-19 RX ORDER — NITROGLYCERIN 0.4 MG/1
TABLET SUBLINGUAL
Status: DISPENSED
Start: 2021-10-19 | End: 2021-10-19

## 2021-10-19 RX ADMIN — NITROGLYCERIN 0.4 MG: 0.4 TABLET SUBLINGUAL at 08:50:00

## 2021-10-19 NOTE — IMAGING NOTE
TO RAD HOLDING AT 0800      HX TAKEN : CHEST TIGHTNESS    PT CONSENTED AT 0811     BASELINE VITAL SIGNS   HR 57   /76 BMI 26.6 / LB    CTA ORDERED BY DCR SOO WAS PT GIVEN CTA  PREMEDS NO, IF YES METOPROLOL 25 MG PO X 2 DOSES    18 GAUGE IV STA

## 2021-10-19 NOTE — TELEPHONE ENCOUNTER
Fax received indirectly from [facility] (thru [address] that the patient's CTA ordered 9/30/21, being done today 10/19/21 may need prior authorization. Referral status remains \"open. \" please look into this.  Thank you

## 2021-11-01 ENCOUNTER — TELEPHONE (OUTPATIENT)
Dept: FAMILY MEDICINE CLINIC | Facility: CLINIC | Age: 66
End: 2021-11-01

## 2021-11-01 DIAGNOSIS — Z12.31 ENCOUNTER FOR MAMMOGRAM TO ESTABLISH BASELINE MAMMOGRAM: Primary | ICD-10-CM

## 2021-11-01 NOTE — TELEPHONE ENCOUNTER
Mary Jane Lomeli, states that the patient would like to schedule her yearly/routine mammogram. Please, add order in the system.

## 2021-11-01 NOTE — TELEPHONE ENCOUNTER
Dr. Remonia Kehr, patient is requesting an order for mammogram. No previous test in chart. Please advise.

## 2021-11-01 NOTE — TELEPHONE ENCOUNTER
Mammogram ordered. Please inform patient she has not come back for her physical.  This was discussed in April.

## 2021-12-27 ENCOUNTER — HOSPITAL ENCOUNTER (OUTPATIENT)
Dept: MAMMOGRAPHY | Facility: HOSPITAL | Age: 66
Discharge: HOME | End: 2021-12-27
Attending: FAMILY MEDICINE
Payer: MEDICARE

## 2021-12-27 DIAGNOSIS — Z12.31 ENCOUNTER FOR MAMMOGRAM TO ESTABLISH BASELINE MAMMOGRAM: ICD-10-CM

## 2021-12-27 PROCEDURE — 77063 BREAST TOMOSYNTHESIS BI: CPT | Performed by: FAMILY MEDICINE

## 2021-12-27 PROCEDURE — 77067 SCR MAMMO BI INCL CAD: CPT | Performed by: FAMILY MEDICINE

## 2022-01-03 ENCOUNTER — TELEPHONE (OUTPATIENT)
Dept: FAMILY MEDICINE CLINIC | Facility: CLINIC | Age: 67
End: 2022-01-03

## 2022-01-03 DIAGNOSIS — L98.9 SKIN LESION: ICD-10-CM

## 2022-01-03 DIAGNOSIS — Z86.69 HISTORY OF GLAUCOMA: ICD-10-CM

## 2022-01-03 DIAGNOSIS — Z12.83 SKIN EXAM, SCREENING FOR CANCER: ICD-10-CM

## 2022-01-03 DIAGNOSIS — I82.4Y9 DEEP VEIN THROMBOSIS (DVT) OF PROXIMAL LOWER EXTREMITY, UNSPECIFIED CHRONICITY, UNSPECIFIED LATERALITY (HCC): ICD-10-CM

## 2022-01-03 DIAGNOSIS — Z01.00 ROUTINE EYE EXAM: Primary | ICD-10-CM

## 2022-01-03 NOTE — TELEPHONE ENCOUNTER
Patient is calling to request referrals for Vascular Doctor, Dermatologist and Eye Doctor. Patient is requesting call back from doctor to discuss options.

## 2022-01-05 NOTE — TELEPHONE ENCOUNTER
Spoke with pt informed her of message below pt had me speak with spouse since her English is not well did informed them referral could take 7-10 business days to get approved.

## 2022-01-25 ENCOUNTER — PATIENT MESSAGE (OUTPATIENT)
Dept: FAMILY MEDICINE CLINIC | Facility: CLINIC | Age: 67
End: 2022-01-25

## 2022-01-25 ENCOUNTER — NURSE TRIAGE (OUTPATIENT)
Dept: FAMILY MEDICINE CLINIC | Facility: CLINIC | Age: 67
End: 2022-01-25

## 2022-01-25 NOTE — TELEPHONE ENCOUNTER
To: EM RN TRIAGE      From: Rosamaria Day      Created: 1/25/2022 4:53 PM        *-*-*This message has not been handled. *-*-*    [facility] doctor Bobbi. I would like to receive a referral to a specialist who can help me with Haital hernia.  I have pain in my s

## 2022-01-25 NOTE — TELEPHONE ENCOUNTER
From: Everett Floor  To: Mennie Halsted Mardi Bibles, MD  Sent: 1/25/2022 4:53 PM CST  Subject: Referral    [facility] doctor Martines. I would like to receive a referral to a specialist who can help me with Haital hernia. I have pain in my stomach and chest. Thank you.

## 2022-01-26 ENCOUNTER — OFFICE VISIT (OUTPATIENT)
Dept: FAMILY MEDICINE CLINIC | Facility: CLINIC | Age: 67
End: 2022-01-26
Payer: MEDICARE

## 2022-01-26 VITALS
WEIGHT: 180 LBS | SYSTOLIC BLOOD PRESSURE: 112 MMHG | TEMPERATURE: 98 F | DIASTOLIC BLOOD PRESSURE: 74 MMHG | BODY MASS INDEX: 28 KG/M2 | HEART RATE: 73 BPM

## 2022-01-26 DIAGNOSIS — R10.10 UPPER ABDOMINAL PAIN: Primary | ICD-10-CM

## 2022-01-26 DIAGNOSIS — K21.9 GASTROESOPHAGEAL REFLUX DISEASE, UNSPECIFIED WHETHER ESOPHAGITIS PRESENT: ICD-10-CM

## 2022-01-26 PROCEDURE — 99214 OFFICE O/P EST MOD 30 MIN: CPT | Performed by: FAMILY MEDICINE

## 2022-01-26 RX ORDER — OMEPRAZOLE 40 MG/1
40 CAPSULE, DELAYED RELEASE ORAL DAILY
Qty: 30 CAPSULE | Refills: 0 | Status: SHIPPED | OUTPATIENT
Start: 2022-01-26 | End: 2023-01-21

## 2022-01-26 NOTE — PROGRESS NOTES
HPI:    Patient ID: Rosamaria Day is a 73year old female. HPI  Patient presents with:  Abdominal Pain  Gastro-esophageal Reflux    Patient here for upper abd pain  Patient states he has a history of acid reflux and GERD.   She takes Pepcid usually onc RASH  Streptomycin            RASH  Other                       Comment:levomicetine   PHYSICAL EXAM:   Patient presents with:  Abdominal Pain  Gastro-esophageal Reflux     Physical Exam  Cardiovascular:      Rate and Rhythm: Normal rate and regular rhythm

## 2022-01-26 NOTE — TELEPHONE ENCOUNTER
Action Requested: Summary for Provider     []  Critical Lab, Recommendations Needed  [] Need Additional Advice  []   FYI    []   Need Orders  [] Need Medications Sent to Pharmacy  []  Other     SUMMARY: Needs evaluation. appt made 12:30pm with DR Steven Ness.

## 2022-01-27 ENCOUNTER — LAB ENCOUNTER (OUTPATIENT)
Dept: LAB | Age: 67
End: 2022-01-27
Attending: FAMILY MEDICINE
Payer: MEDICARE

## 2022-01-27 DIAGNOSIS — R10.10 UPPER ABDOMINAL PAIN: ICD-10-CM

## 2022-01-27 LAB
ALBUMIN SERPL-MCNC: 3.9 G/DL (ref 3.4–5)
ALBUMIN/GLOB SERPL: 1.4 {RATIO} (ref 1–2)
ALP LIVER SERPL-CCNC: 58 U/L
ALT SERPL-CCNC: 26 U/L
ANION GAP SERPL CALC-SCNC: 6 MMOL/L (ref 0–18)
AST SERPL-CCNC: 28 U/L (ref 15–37)
BASOPHILS # BLD AUTO: 0.04 X10(3) UL (ref 0–0.2)
BASOPHILS NFR BLD AUTO: 0.7 %
BILIRUB SERPL-MCNC: 0.5 MG/DL (ref 0.1–2)
BUN BLD-MCNC: 18 MG/DL (ref 7–18)
BUN/CREAT SERPL: 22.5 (ref 10–20)
CALCIUM BLD-MCNC: 8.6 MG/DL (ref 8.5–10.1)
CHLORIDE SERPL-SCNC: 109 MMOL/L (ref 98–112)
CO2 SERPL-SCNC: 26 MMOL/L (ref 21–32)
CREAT BLD-MCNC: 0.8 MG/DL
DEPRECATED RDW RBC AUTO: 40.9 FL (ref 35.1–46.3)
EOSINOPHIL # BLD AUTO: 0.1 X10(3) UL (ref 0–0.7)
EOSINOPHIL NFR BLD AUTO: 1.8 %
ERYTHROCYTE [DISTWIDTH] IN BLOOD BY AUTOMATED COUNT: 12.2 % (ref 11–15)
FASTING STATUS PATIENT QL REPORTED: YES
GLOBULIN PLAS-MCNC: 2.8 G/DL (ref 2.8–4.4)
GLUCOSE BLD-MCNC: 96 MG/DL (ref 70–99)
HCT VFR BLD AUTO: 42.5 %
HGB BLD-MCNC: 13.8 G/DL
IMM GRANULOCYTES # BLD AUTO: 0.02 X10(3) UL (ref 0–1)
IMM GRANULOCYTES NFR BLD: 0.4 %
LIPASE SERPL-CCNC: 90 U/L (ref 73–393)
LYMPHOCYTES # BLD AUTO: 1.42 X10(3) UL (ref 1–4)
LYMPHOCYTES NFR BLD AUTO: 25.8 %
MCH RBC QN AUTO: 29.7 PG (ref 26–34)
MCHC RBC AUTO-ENTMCNC: 32.5 G/DL (ref 31–37)
MCV RBC AUTO: 91.6 FL
MONOCYTES # BLD AUTO: 0.44 X10(3) UL (ref 0.1–1)
MONOCYTES NFR BLD AUTO: 8 %
NEUTROPHILS # BLD AUTO: 3.49 X10 (3) UL (ref 1.5–7.7)
NEUTROPHILS # BLD AUTO: 3.49 X10(3) UL (ref 1.5–7.7)
NEUTROPHILS NFR BLD AUTO: 63.3 %
OSMOLALITY SERPL CALC.SUM OF ELEC: 294 MOSM/KG (ref 275–295)
PLATELET # BLD AUTO: 133 10(3)UL (ref 150–450)
POTASSIUM SERPL-SCNC: 4.4 MMOL/L (ref 3.5–5.1)
PROT SERPL-MCNC: 6.7 G/DL (ref 6.4–8.2)
RBC # BLD AUTO: 4.64 X10(6)UL
SODIUM SERPL-SCNC: 141 MMOL/L (ref 136–145)
WBC # BLD AUTO: 5.5 X10(3) UL (ref 4–11)

## 2022-01-27 PROCEDURE — 85025 COMPLETE CBC W/AUTO DIFF WBC: CPT

## 2022-01-27 PROCEDURE — 83690 ASSAY OF LIPASE: CPT

## 2022-01-27 PROCEDURE — 36415 COLL VENOUS BLD VENIPUNCTURE: CPT

## 2022-01-27 PROCEDURE — 80053 COMPREHEN METABOLIC PANEL: CPT

## 2022-01-27 PROCEDURE — 87338 HPYLORI STOOL AG IA: CPT

## 2022-01-28 ENCOUNTER — TELEPHONE (OUTPATIENT)
Dept: FAMILY MEDICINE CLINIC | Facility: CLINIC | Age: 67
End: 2022-01-28

## 2022-01-28 LAB — HELICOBACTER PYLORI AG, FECAL: NEGATIVE

## 2022-01-28 NOTE — TELEPHONE ENCOUNTER
Patient's , Beth Foreman, is calling to inform PCP they are unable to be seen until 07/2022 with the provider referred to Dr. Hernan Walton for the endoscopy. Are you able to assist with another provider that is able to see her sooner? Please advise patient.

## 2022-01-31 ENCOUNTER — TELEPHONE (OUTPATIENT)
Dept: GASTROENTEROLOGY | Facility: CLINIC | Age: 67
End: 2022-01-31

## 2022-01-31 NOTE — TELEPHONE ENCOUNTER
I called GI and they stated that pt has to see same provider,  But they will sed a message if anything earlier. Pt contacted and informed.

## 2022-01-31 NOTE — TELEPHONE ENCOUNTER
Nubia, from Dr. Ariel Marinelli office to requesting sooner than 7/20/2022. Patient has reflux and pain, please call at [phone],thanks.

## 2022-01-31 NOTE — TELEPHONE ENCOUNTER
I spoke to the pt and we scheduled an appt in [place] this Friday.  Date, time and location verfied with the pt    Your Appointments    Friday February 04, 2022 10:00 AM  Consult with Barb Marks, MD [address], [address], [facility]

## 2022-02-04 ENCOUNTER — TELEPHONE (OUTPATIENT)
Dept: GASTROENTEROLOGY | Facility: CLINIC | Age: 67
End: 2022-02-04

## 2022-02-04 ENCOUNTER — OFFICE VISIT (OUTPATIENT)
Dept: GASTROENTEROLOGY | Facility: CLINIC | Age: 67
End: 2022-02-04
Payer: MEDICARE

## 2022-02-04 VITALS
SYSTOLIC BLOOD PRESSURE: 114 MMHG | WEIGHT: 179.19 LBS | HEIGHT: 67 IN | HEART RATE: 69 BPM | DIASTOLIC BLOOD PRESSURE: 80 MMHG | BODY MASS INDEX: 28.12 KG/M2

## 2022-02-04 DIAGNOSIS — R10.12 LUQ ABDOMINAL PAIN: ICD-10-CM

## 2022-02-04 DIAGNOSIS — R10.13 EPIGASTRIC PAIN: Primary | ICD-10-CM

## 2022-02-04 DIAGNOSIS — Z86.718 HISTORY OF RECURRENT DEEP VEIN THROMBOSIS: ICD-10-CM

## 2022-02-04 DIAGNOSIS — D18.03 LIVER HEMANGIOMA: ICD-10-CM

## 2022-02-04 DIAGNOSIS — K59.04 CHRONIC IDIOPATHIC CONSTIPATION: ICD-10-CM

## 2022-02-04 PROCEDURE — 99214 OFFICE O/P EST MOD 30 MIN: CPT | Performed by: INTERNAL MEDICINE

## 2022-02-04 NOTE — TELEPHONE ENCOUNTER
Dr. Yahaira Ying,    I spoke to patient and . They want to know if they are scheduled for the CT A/P if they should also need to complete US Galbladder ordered by Dr. Linda Lance. Patient is scheduled for next week and requested call back today if able, thank you!

## 2022-02-07 NOTE — TELEPHONE ENCOUNTER
Please call Ms. Patel to advise that CT scan would be the most important because it evaluates everything in her abdomen. However, please recommend to MsAnastacio Burnette that she complete both tests as the gallbladder ultrasound is a much better way to evaluate for gallstones. Gallstones do not always show up on CT scan.

## 2022-02-07 NOTE — TELEPHONE ENCOUNTER
I spoke to the pt and I advised that Dr Estela Johnson would like her to have the CT scan and the US.      I explained to her that Dr Estela Johnson said that gallstones do not always show up on CT scans    She will call and schedule an US

## 2022-02-09 ENCOUNTER — HOSPITAL ENCOUNTER (OUTPATIENT)
Dept: CT IMAGING | Facility: HOSPITAL | Age: 67
Discharge: HOME | End: 2022-02-09
Attending: INTERNAL MEDICINE
Payer: MEDICARE

## 2022-02-09 DIAGNOSIS — R10.12 LUQ ABDOMINAL PAIN: ICD-10-CM

## 2022-02-09 DIAGNOSIS — R10.13 EPIGASTRIC PAIN: ICD-10-CM

## 2022-02-09 PROCEDURE — 74177 CT ABD & PELVIS W/CONTRAST: CPT | Performed by: INTERNAL MEDICINE

## 2022-02-09 RX ORDER — IOHEXOL 350 MG/ML
100 INJECTION, SOLUTION INTRAVENOUS
Status: COMPLETED | OUTPATIENT
Start: 2022-02-09 | End: 2022-02-09

## 2022-02-09 RX ADMIN — IOHEXOL 100 ML: 350 INJECTION, SOLUTION INTRAVENOUS at 07:20:00

## 2022-02-23 ENCOUNTER — OFFICE VISIT (OUTPATIENT)
Dept: PODIATRY CLINIC | Facility: CLINIC | Age: 67
End: 2022-02-23
Payer: MEDICARE

## 2022-02-23 VITALS — HEART RATE: 67 BPM | DIASTOLIC BLOOD PRESSURE: 73 MMHG | SYSTOLIC BLOOD PRESSURE: 110 MMHG

## 2022-02-23 DIAGNOSIS — M79.675 GREAT TOE PAIN, LEFT: Primary | ICD-10-CM

## 2022-02-23 DIAGNOSIS — B35.1 ONYCHOMYCOSIS: ICD-10-CM

## 2022-02-23 DIAGNOSIS — L60.1 ONYCHOLYSIS: ICD-10-CM

## 2022-02-23 PROCEDURE — 99203 OFFICE O/P NEW LOW 30 MIN: CPT | Performed by: PODIATRIST

## 2022-02-23 PROCEDURE — 11750 EXCISION NAIL&NAIL MATRIX: CPT | Performed by: PODIATRIST

## 2022-02-23 NOTE — PROGRESS NOTES
Per Verbal orders from Dr. Casey Mai, draw up 1.5ml of 0.5% Marcaine and 1.5ml of 1% lidocaine for injection to left big toe.   Rocio Peralta

## 2022-02-26 ENCOUNTER — TELEPHONE (OUTPATIENT)
Dept: GASTROENTEROLOGY | Facility: CLINIC | Age: 67
End: 2022-02-26

## 2022-02-28 RX ORDER — PLECANATIDE 3 MG/1
3 TABLET ORAL DAILY
Qty: 90 TABLET | Refills: 3 | Status: SHIPPED | OUTPATIENT
Start: 2022-02-28 | End: 2022-03-05

## 2022-02-28 NOTE — TELEPHONE ENCOUNTER
Thank you Karen Dela Cruz!!  Let's see if Trulance or [place] is covered. [facility]

## 2022-02-28 NOTE — TELEPHONE ENCOUNTER
Dr Saloni Harmon,    I spoke to [address] [place] [address] went over your recommendations and f/u instructions with her regarding the CT scan. She is will to try a prescription strength laxative.     I verified the [facility] in [facility] as her pharmacy of choice

## 2022-02-28 NOTE — TELEPHONE ENCOUNTER
Dr Criselda Eason,    I spoke to the pt's     They have prescription coverage through the [place]     is asking if we can mail them a copy of the prescription.     If you can print out the prescription for the Trulance, I can mail it to their house

## 2022-03-05 RX ORDER — PLECANATIDE 3 MG/1
3 TABLET ORAL DAILY
Qty: 90 TABLET | Refills: 3 | Status: SHIPPED | OUTPATIENT
Start: 2022-03-05 | End: 2022-06-03

## 2022-03-09 ENCOUNTER — OFFICE VISIT (OUTPATIENT)
Dept: PODIATRY CLINIC | Facility: CLINIC | Age: 67
End: 2022-03-09
Payer: MEDICARE

## 2022-03-09 DIAGNOSIS — Z98.890 S/P NAIL SURGERY: Primary | ICD-10-CM

## 2022-03-09 PROCEDURE — 99212 OFFICE O/P EST SF 10 MIN: CPT | Performed by: PODIATRIST

## 2022-03-14 NOTE — TELEPHONE ENCOUNTER
I spoke to the pt     She will come to the office to  the Trulance prescription.     It was placed at the  for

## 2022-06-08 ENCOUNTER — TELEPHONE (OUTPATIENT)
Dept: GASTROENTEROLOGY | Facility: CLINIC | Age: 67
End: 2022-06-08

## 2022-06-08 ENCOUNTER — OFFICE VISIT (OUTPATIENT)
Dept: GASTROENTEROLOGY | Facility: CLINIC | Age: 67
End: 2022-06-08
Payer: MEDICARE

## 2022-06-08 VITALS
WEIGHT: 181 LBS | SYSTOLIC BLOOD PRESSURE: 106 MMHG | HEIGHT: 67 IN | BODY MASS INDEX: 28.41 KG/M2 | DIASTOLIC BLOOD PRESSURE: 74 MMHG | HEART RATE: 65 BPM

## 2022-06-08 DIAGNOSIS — R10.13 DYSPEPSIA: Primary | ICD-10-CM

## 2022-06-08 DIAGNOSIS — R11.10 REGURGITATION OF FOOD: ICD-10-CM

## 2022-06-08 DIAGNOSIS — R10.12 LUQ ABDOMINAL PAIN: ICD-10-CM

## 2022-06-08 DIAGNOSIS — R10.13 EPIGASTRIC PAIN: Primary | ICD-10-CM

## 2022-06-08 DIAGNOSIS — D18.03 LIVER HEMANGIOMA: ICD-10-CM

## 2022-06-08 DIAGNOSIS — K21.9 GASTROESOPHAGEAL REFLUX DISEASE, UNSPECIFIED WHETHER ESOPHAGITIS PRESENT: ICD-10-CM

## 2022-06-08 DIAGNOSIS — K59.04 CHRONIC IDIOPATHIC CONSTIPATION: ICD-10-CM

## 2022-06-08 PROCEDURE — 99214 OFFICE O/P EST MOD 30 MIN: CPT | Performed by: INTERNAL MEDICINE

## 2022-06-08 PROCEDURE — 1126F AMNT PAIN NOTED NONE PRSNT: CPT | Performed by: INTERNAL MEDICINE

## 2022-06-08 NOTE — TELEPHONE ENCOUNTER
Scheduled for:  EGD 82087  Provider Name:  Dr. Yahaira Ying  Date:  9/15/2022  Location[facility]  [facility]  Sedation:  MAC  Time:  3:15 pm, (pt is aware that [facility] will call the day before to confirm arrival time)  Prep:  NPO after midnight  Meds/Allergies Reconciled?:  Physician reviewed  Diagnosis with codes:  Dyspepsia R10.13; GERD K21.9; Regurgitation R11.10  Was patient informed to call insurance with codes (Y/N):  Yes  Referral sent?:  Yes  [address] [place] [address] or [facility] notified?:  Electronic case request was sent to [facility] via [employer]. Medication Orders: \"Hold ? Xarelto 2 days prior to procedure\"  Misc Orders:  Patient was informed that they will need a COVID 19 test prior to their procedure. Patient verbally understood & will await a phone call from [facility] to schedule. Further instructions given by staff:  I provided patient with prep instruction sheet.

## 2022-07-15 ENCOUNTER — OFFICE VISIT (OUTPATIENT)
Dept: DERMATOLOGY CLINIC | Facility: CLINIC | Age: 67
End: 2022-07-15
Payer: MEDICARE

## 2022-07-15 DIAGNOSIS — D23.30 BENIGN NEOPLASM OF SKIN OF FACE: ICD-10-CM

## 2022-07-15 DIAGNOSIS — L82.1 SK (SEBORRHEIC KERATOSIS): ICD-10-CM

## 2022-07-15 DIAGNOSIS — L30.9 DERMATITIS: Primary | ICD-10-CM

## 2022-07-15 DIAGNOSIS — D23.9 BENIGN NEOPLASM OF SKIN, UNSPECIFIED LOCATION: ICD-10-CM

## 2022-07-15 PROCEDURE — 99213 OFFICE O/P EST LOW 20 MIN: CPT | Performed by: DERMATOLOGY

## 2022-08-03 ENCOUNTER — OFFICE VISIT (OUTPATIENT)
Dept: FAMILY MEDICINE CLINIC | Facility: CLINIC | Age: 67
End: 2022-08-03
Payer: MEDICARE

## 2022-08-03 VITALS
HEART RATE: 63 BPM | DIASTOLIC BLOOD PRESSURE: 61 MMHG | SYSTOLIC BLOOD PRESSURE: 95 MMHG | BODY MASS INDEX: 27.94 KG/M2 | WEIGHT: 178 LBS | HEIGHT: 67 IN | TEMPERATURE: 98 F

## 2022-08-03 DIAGNOSIS — N83.202 CYST OF LEFT OVARY: ICD-10-CM

## 2022-08-03 DIAGNOSIS — I82.4Y2 DEEP VEIN THROMBOSIS (DVT) OF PROXIMAL VEIN OF LEFT LOWER EXTREMITY, UNSPECIFIED CHRONICITY (HCC): Primary | ICD-10-CM

## 2022-08-03 DIAGNOSIS — Z79.01 ANTICOAGULATED: ICD-10-CM

## 2022-08-03 DIAGNOSIS — R39.9 ABNORMAL FINDING OF KIDNEY: ICD-10-CM

## 2022-08-03 DIAGNOSIS — L30.9 DERMATITIS: ICD-10-CM

## 2022-08-03 PROCEDURE — 99214 OFFICE O/P EST MOD 30 MIN: CPT | Performed by: FAMILY MEDICINE

## 2022-08-05 ENCOUNTER — LAB ENCOUNTER (OUTPATIENT)
Dept: LAB | Age: 67
End: 2022-08-05
Attending: FAMILY MEDICINE
Payer: MEDICARE

## 2022-08-05 DIAGNOSIS — I82.4Y2 DEEP VEIN THROMBOSIS (DVT) OF PROXIMAL VEIN OF LEFT LOWER EXTREMITY, UNSPECIFIED CHRONICITY (HCC): ICD-10-CM

## 2022-08-05 DIAGNOSIS — L30.9 DERMATITIS: ICD-10-CM

## 2022-08-05 LAB
ALBUMIN SERPL-MCNC: 3.8 G/DL (ref 3.4–5)
ALBUMIN/GLOB SERPL: 1.1 {RATIO} (ref 1–2)
ALP LIVER SERPL-CCNC: 54 U/L
ALT SERPL-CCNC: 24 U/L
ANION GAP SERPL CALC-SCNC: 2 MMOL/L (ref 0–18)
AST SERPL-CCNC: 20 U/L (ref 15–37)
BASOPHILS # BLD AUTO: 0.04 X10(3) UL (ref 0–0.2)
BASOPHILS NFR BLD AUTO: 0.8 %
BILIRUB SERPL-MCNC: 0.5 MG/DL (ref 0.1–2)
BUN BLD-MCNC: 11 MG/DL (ref 7–18)
BUN/CREAT SERPL: 11.6 (ref 10–20)
CALCIUM BLD-MCNC: 9.3 MG/DL (ref 8.5–10.1)
CHLORIDE SERPL-SCNC: 112 MMOL/L (ref 98–112)
CO2 SERPL-SCNC: 28 MMOL/L (ref 21–32)
CREAT BLD-MCNC: 0.95 MG/DL
DEPRECATED RDW RBC AUTO: 40.8 FL (ref 35.1–46.3)
EOSINOPHIL # BLD AUTO: 0.07 X10(3) UL (ref 0–0.7)
EOSINOPHIL NFR BLD AUTO: 1.3 %
ERYTHROCYTE [DISTWIDTH] IN BLOOD BY AUTOMATED COUNT: 11.8 % (ref 11–15)
FASTING STATUS PATIENT QL REPORTED: YES
GFR SERPLBLD BASED ON 1.73 SQ M-ARVRAT: 66 ML/MIN/1.73M2 (ref 60–?)
GLOBULIN PLAS-MCNC: 3.4 G/DL (ref 2.8–4.4)
GLUCOSE BLD-MCNC: 110 MG/DL (ref 70–99)
HCT VFR BLD AUTO: 43.6 %
HGB BLD-MCNC: 14 G/DL
IMM GRANULOCYTES # BLD AUTO: 0.01 X10(3) UL (ref 0–1)
IMM GRANULOCYTES NFR BLD: 0.2 %
LYMPHOCYTES # BLD AUTO: 1.65 X10(3) UL (ref 1–4)
LYMPHOCYTES NFR BLD AUTO: 31.2 %
MCH RBC QN AUTO: 30 PG (ref 26–34)
MCHC RBC AUTO-ENTMCNC: 32.1 G/DL (ref 31–37)
MCV RBC AUTO: 93.6 FL
MONOCYTES # BLD AUTO: 0.42 X10(3) UL (ref 0.1–1)
MONOCYTES NFR BLD AUTO: 7.9 %
NEUTROPHILS # BLD AUTO: 3.1 X10 (3) UL (ref 1.5–7.7)
NEUTROPHILS # BLD AUTO: 3.1 X10(3) UL (ref 1.5–7.7)
NEUTROPHILS NFR BLD AUTO: 58.6 %
OSMOLALITY SERPL CALC.SUM OF ELEC: 294 MOSM/KG (ref 275–295)
PLATELET # BLD AUTO: 203 10(3)UL (ref 150–450)
POTASSIUM SERPL-SCNC: 4.9 MMOL/L (ref 3.5–5.1)
PROT SERPL-MCNC: 7.2 G/DL (ref 6.4–8.2)
RBC # BLD AUTO: 4.66 X10(6)UL
SODIUM SERPL-SCNC: 142 MMOL/L (ref 136–145)
WBC # BLD AUTO: 5.3 X10(3) UL (ref 4–11)

## 2022-08-05 PROCEDURE — 80053 COMPREHEN METABOLIC PANEL: CPT

## 2022-08-05 PROCEDURE — 36415 COLL VENOUS BLD VENIPUNCTURE: CPT

## 2022-08-05 PROCEDURE — 86039 ANTINUCLEAR ANTIBODIES (ANA): CPT

## 2022-08-05 PROCEDURE — 85025 COMPLETE CBC W/AUTO DIFF WBC: CPT

## 2022-08-05 PROCEDURE — 86038 ANTINUCLEAR ANTIBODIES: CPT

## 2022-08-08 LAB — NUCLEAR IGG TITR SER IF: POSITIVE {TITER}

## 2022-08-10 DIAGNOSIS — R76.8 POSITIVE ANA (ANTINUCLEAR ANTIBODY): Primary | ICD-10-CM

## 2022-08-10 DIAGNOSIS — R79.89 D-DIMER, ELEVATED: ICD-10-CM

## 2022-08-10 LAB — ANA NUCLEOLAR TITR SER IF: 320 {TITER}

## 2022-09-07 ENCOUNTER — OFFICE VISIT (OUTPATIENT)
Dept: RHEUMATOLOGY | Facility: CLINIC | Age: 67
End: 2022-09-07
Payer: MEDICARE

## 2022-09-07 ENCOUNTER — LAB ENCOUNTER (OUTPATIENT)
Dept: LAB | Facility: HOSPITAL | Age: 67
End: 2022-09-07
Attending: FAMILY MEDICINE
Payer: MEDICARE

## 2022-09-07 ENCOUNTER — HOSPITAL ENCOUNTER (OUTPATIENT)
Dept: ULTRASOUND IMAGING | Age: 67
Discharge: HOME | End: 2022-09-07
Attending: FAMILY MEDICINE
Payer: MEDICARE

## 2022-09-07 VITALS
WEIGHT: 177 LBS | SYSTOLIC BLOOD PRESSURE: 97 MMHG | HEIGHT: 67 IN | DIASTOLIC BLOOD PRESSURE: 64 MMHG | BODY MASS INDEX: 27.78 KG/M2 | HEART RATE: 66 BPM

## 2022-09-07 DIAGNOSIS — R76.8 POSITIVE ANA (ANTINUCLEAR ANTIBODY): ICD-10-CM

## 2022-09-07 DIAGNOSIS — R39.9 ABNORMAL FINDING OF KIDNEY: ICD-10-CM

## 2022-09-07 DIAGNOSIS — R76.8 POSITIVE ANA (ANTINUCLEAR ANTIBODY): Primary | ICD-10-CM

## 2022-09-07 PROCEDURE — 36415 COLL VENOUS BLD VENIPUNCTURE: CPT

## 2022-09-07 PROCEDURE — 76775 US EXAM ABDO BACK WALL LIM: CPT | Performed by: FAMILY MEDICINE

## 2022-09-07 PROCEDURE — 86235 NUCLEAR ANTIGEN ANTIBODY: CPT

## 2022-09-07 PROCEDURE — 99204 OFFICE O/P NEW MOD 45 MIN: CPT | Performed by: INTERNAL MEDICINE

## 2022-09-07 PROCEDURE — 86225 DNA ANTIBODY NATIVE: CPT

## 2022-09-07 NOTE — PATIENT INSTRUCTIONS
You were seen today for positive RENEE  You have no symptoms of autoimmune disease  Will do some more blood work to rule out autoimmune disease

## 2022-09-09 ENCOUNTER — HOSPITAL ENCOUNTER (OUTPATIENT)
Dept: ULTRASOUND IMAGING | Facility: HOSPITAL | Age: 67
Discharge: HOME | End: 2022-09-09
Attending: FAMILY MEDICINE
Payer: MEDICARE

## 2022-09-09 DIAGNOSIS — N83.202 CYST OF LEFT OVARY: ICD-10-CM

## 2022-09-09 LAB — DSDNA AB TITR SER: <10 {TITER}

## 2022-09-09 PROCEDURE — 76830 TRANSVAGINAL US NON-OB: CPT | Performed by: FAMILY MEDICINE

## 2022-09-09 PROCEDURE — 76856 US EXAM PELVIC COMPLETE: CPT | Performed by: FAMILY MEDICINE

## 2022-09-11 DIAGNOSIS — N83.202 LEFT OVARIAN CYST: Primary | ICD-10-CM

## 2022-09-14 LAB
ENA SM IGG SER QL: NEGATIVE
ENA SM+RNP AB SER QL: NEGATIVE
ENA SS-A AB SER QL IA: NEGATIVE
ENA SS-B AB SER QL IA: NEGATIVE

## 2022-09-15 ENCOUNTER — SURGERY CENTER DOCUMENTATION (OUTPATIENT)
Dept: SURGERY | Age: 67
End: 2022-09-15

## 2022-09-15 ENCOUNTER — LAB REQUISITION (OUTPATIENT)
Dept: SURGERY | Age: 67
End: 2022-09-15
Payer: MEDICARE

## 2022-09-15 DIAGNOSIS — K21.00 REFLUX ESOPHAGITIS: ICD-10-CM

## 2022-09-15 PROCEDURE — 88312 SPECIAL STAINS GROUP 1: CPT | Performed by: INTERNAL MEDICINE

## 2022-09-15 PROCEDURE — 88305 TISSUE EXAM BY PATHOLOGIST: CPT | Performed by: INTERNAL MEDICINE

## 2022-09-23 ENCOUNTER — MED REC SCAN ONLY (OUTPATIENT)
Dept: GASTROENTEROLOGY | Facility: CLINIC | Age: 67
End: 2022-09-23

## 2022-10-10 ENCOUNTER — TELEPHONE (OUTPATIENT)
Dept: GASTROENTEROLOGY | Facility: CLINIC | Age: 67
End: 2022-10-10

## 2022-10-10 NOTE — TELEPHONE ENCOUNTER
Results letter mailed per   No recall for colon . Willi Victoria MD  [facility] Clinical Staff  GI RNs - 1.  Please print and mail this letter to patient

## 2022-11-23 ENCOUNTER — OFFICE VISIT (OUTPATIENT)
Dept: FAMILY MEDICINE CLINIC | Facility: CLINIC | Age: 67
End: 2022-11-23
Payer: MEDICARE

## 2022-11-23 VITALS
HEART RATE: 70 BPM | TEMPERATURE: 98 F | HEIGHT: 67 IN | BODY MASS INDEX: 28.09 KG/M2 | SYSTOLIC BLOOD PRESSURE: 111 MMHG | DIASTOLIC BLOOD PRESSURE: 79 MMHG | WEIGHT: 179 LBS

## 2022-11-23 DIAGNOSIS — N83.202 LEFT OVARIAN CYST: ICD-10-CM

## 2022-11-23 DIAGNOSIS — Z23 NEED FOR SHINGLES VACCINE: ICD-10-CM

## 2022-11-23 DIAGNOSIS — Z01.419 ENCOUNTER FOR GYNECOLOGICAL EXAMINATION: ICD-10-CM

## 2022-11-23 DIAGNOSIS — Z23 NEED FOR VACCINATION: ICD-10-CM

## 2022-11-23 DIAGNOSIS — Z80.0 FH: COLON CANCER: ICD-10-CM

## 2022-11-23 DIAGNOSIS — Z00.00 ENCOUNTER FOR ANNUAL HEALTH EXAMINATION: Primary | ICD-10-CM

## 2022-11-23 DIAGNOSIS — Z80.3 FH: BREAST CANCER: ICD-10-CM

## 2022-11-23 DIAGNOSIS — Z86.718 HISTORY OF RECURRENT DEEP VEIN THROMBOSIS: ICD-10-CM

## 2022-11-23 DIAGNOSIS — Z90.710 HISTORY OF HYSTERECTOMY FOR BENIGN DISEASE: ICD-10-CM

## 2022-11-23 DIAGNOSIS — Z79.01 ANTICOAGULATED: ICD-10-CM

## 2022-11-23 DIAGNOSIS — M85.80 OSTEOPENIA, UNSPECIFIED LOCATION: ICD-10-CM

## 2022-11-23 DIAGNOSIS — E78.00 HYPERCHOLESTEREMIA: ICD-10-CM

## 2022-11-23 DIAGNOSIS — D18.03 LIVER HEMANGIOMA: ICD-10-CM

## 2022-11-23 DIAGNOSIS — Z12.31 ENCOUNTER FOR SCREENING MAMMOGRAM FOR BREAST CANCER: ICD-10-CM

## 2022-11-23 DIAGNOSIS — Z11.59 NEED FOR HEPATITIS C SCREENING TEST: ICD-10-CM

## 2022-11-23 DIAGNOSIS — R76.8 POSITIVE ANA (ANTINUCLEAR ANTIBODY): ICD-10-CM

## 2022-11-23 DIAGNOSIS — K21.9 GASTROESOPHAGEAL REFLUX DISEASE, UNSPECIFIED WHETHER ESOPHAGITIS PRESENT: ICD-10-CM

## 2022-11-23 PROBLEM — R79.89 D-DIMER, ELEVATED: Status: RESOLVED | Noted: 2017-08-09 | Resolved: 2022-11-23

## 2022-11-23 PROBLEM — L81.9 ATYPICAL PIGMENTED SKIN LESION: Status: RESOLVED | Noted: 2017-08-31 | Resolved: 2022-11-23

## 2022-11-23 PROBLEM — L60.9 DISEASE OF NAIL: Status: RESOLVED | Noted: 2017-10-23 | Resolved: 2022-11-23

## 2022-11-23 PROBLEM — L08.9 SKIN INFLAMMATION: Status: RESOLVED | Noted: 2021-09-21 | Resolved: 2022-11-23

## 2022-11-23 PROBLEM — S62.102D FRACTURE OF LEFT WRIST WITH ROUTINE HEALING: Status: RESOLVED | Noted: 2021-06-17 | Resolved: 2022-11-23

## 2022-11-23 PROBLEM — Z86.69 HISTORY OF GLAUCOMA: Status: RESOLVED | Noted: 2021-04-20 | Resolved: 2022-11-23

## 2022-11-23 PROBLEM — L57.8 SUN-DAMAGED SKIN: Status: RESOLVED | Noted: 2017-10-23 | Resolved: 2022-11-23

## 2022-11-23 PROBLEM — R07.89 CHEST TIGHTNESS: Status: RESOLVED | Noted: 2021-04-20 | Resolved: 2022-11-23

## 2022-11-23 RX ORDER — ATORVASTATIN CALCIUM 10 MG/1
10 TABLET, FILM COATED ORAL NIGHTLY
COMMUNITY

## 2022-11-23 RX ORDER — ALENDRONATE SODIUM 70 MG/1
70 TABLET ORAL WEEKLY
Qty: 12 TABLET | Refills: 3 | Status: SHIPPED | OUTPATIENT
Start: 2022-11-23 | End: 2022-11-23

## 2022-11-23 RX ORDER — ALENDRONATE SODIUM 70 MG/1
70 TABLET ORAL WEEKLY
Qty: 12 TABLET | Refills: 3 | Status: SHIPPED | OUTPATIENT
Start: 2022-11-23

## 2022-11-26 ENCOUNTER — LAB ENCOUNTER (OUTPATIENT)
Dept: LAB | Age: 67
End: 2022-11-26
Attending: FAMILY MEDICINE
Payer: MEDICARE

## 2022-11-26 DIAGNOSIS — M85.80 OSTEOPENIA, UNSPECIFIED LOCATION: ICD-10-CM

## 2022-11-26 DIAGNOSIS — Z79.01 ANTICOAGULATED: ICD-10-CM

## 2022-11-26 DIAGNOSIS — Z11.59 NEED FOR HEPATITIS C SCREENING TEST: ICD-10-CM

## 2022-11-26 DIAGNOSIS — E78.00 HYPERCHOLESTEREMIA: ICD-10-CM

## 2022-11-26 LAB
ALBUMIN SERPL-MCNC: 3.7 G/DL (ref 3.4–5)
ALBUMIN/GLOB SERPL: 1.3 {RATIO} (ref 1–2)
ALP LIVER SERPL-CCNC: 55 U/L
ALT SERPL-CCNC: 21 U/L
ANION GAP SERPL CALC-SCNC: 5 MMOL/L (ref 0–18)
AST SERPL-CCNC: 18 U/L (ref 15–37)
BASOPHILS # BLD AUTO: 0.05 X10(3) UL (ref 0–0.2)
BASOPHILS NFR BLD AUTO: 0.9 %
BILIRUB SERPL-MCNC: 0.6 MG/DL (ref 0.1–2)
BUN BLD-MCNC: 16 MG/DL (ref 7–18)
BUN/CREAT SERPL: 19 (ref 10–20)
CALCIUM BLD-MCNC: 9.2 MG/DL (ref 8.5–10.1)
CHLORIDE SERPL-SCNC: 110 MMOL/L (ref 98–112)
CHOLEST SERPL-MCNC: 190 MG/DL (ref ?–200)
CO2 SERPL-SCNC: 28 MMOL/L (ref 21–32)
CREAT BLD-MCNC: 0.84 MG/DL
DEPRECATED RDW RBC AUTO: 41.3 FL (ref 35.1–46.3)
EOSINOPHIL # BLD AUTO: 0.16 X10(3) UL (ref 0–0.7)
EOSINOPHIL NFR BLD AUTO: 2.9 %
ERYTHROCYTE [DISTWIDTH] IN BLOOD BY AUTOMATED COUNT: 11.9 % (ref 11–15)
FASTING PATIENT LIPID ANSWER: YES
FASTING STATUS PATIENT QL REPORTED: YES
GFR SERPLBLD BASED ON 1.73 SQ M-ARVRAT: 77 ML/MIN/1.73M2 (ref 60–?)
GLOBULIN PLAS-MCNC: 2.8 G/DL (ref 2.8–4.4)
GLUCOSE BLD-MCNC: 97 MG/DL (ref 70–99)
HCT VFR BLD AUTO: 43.2 %
HCV AB SERPL QL IA: NONREACTIVE
HDLC SERPL-MCNC: 77 MG/DL (ref 40–59)
HGB BLD-MCNC: 13.8 G/DL
IMM GRANULOCYTES # BLD AUTO: 0.01 X10(3) UL (ref 0–1)
IMM GRANULOCYTES NFR BLD: 0.2 %
LDLC SERPL CALC-MCNC: 100 MG/DL (ref ?–100)
LYMPHOCYTES # BLD AUTO: 1.69 X10(3) UL (ref 1–4)
LYMPHOCYTES NFR BLD AUTO: 31 %
MCH RBC QN AUTO: 30.2 PG (ref 26–34)
MCHC RBC AUTO-ENTMCNC: 31.9 G/DL (ref 31–37)
MCV RBC AUTO: 94.5 FL
MONOCYTES # BLD AUTO: 0.42 X10(3) UL (ref 0.1–1)
MONOCYTES NFR BLD AUTO: 7.7 %
NEUTROPHILS # BLD AUTO: 3.13 X10 (3) UL (ref 1.5–7.7)
NEUTROPHILS # BLD AUTO: 3.13 X10(3) UL (ref 1.5–7.7)
NEUTROPHILS NFR BLD AUTO: 57.3 %
NONHDLC SERPL-MCNC: 113 MG/DL (ref ?–130)
OSMOLALITY SERPL CALC.SUM OF ELEC: 297 MOSM/KG (ref 275–295)
PLATELET # BLD AUTO: 206 10(3)UL (ref 150–450)
POTASSIUM SERPL-SCNC: 4.7 MMOL/L (ref 3.5–5.1)
PROT SERPL-MCNC: 6.5 G/DL (ref 6.4–8.2)
RBC # BLD AUTO: 4.57 X10(6)UL
SODIUM SERPL-SCNC: 143 MMOL/L (ref 136–145)
TRIGL SERPL-MCNC: 73 MG/DL (ref 30–149)
VIT D+METAB SERPL-MCNC: 41.5 NG/ML (ref 30–100)
VLDLC SERPL CALC-MCNC: 12 MG/DL (ref 0–30)
WBC # BLD AUTO: 5.5 X10(3) UL (ref 4–11)

## 2022-11-26 PROCEDURE — 80053 COMPREHEN METABOLIC PANEL: CPT

## 2022-11-26 PROCEDURE — 85025 COMPLETE CBC W/AUTO DIFF WBC: CPT

## 2022-11-26 PROCEDURE — 86803 HEPATITIS C AB TEST: CPT

## 2022-11-26 PROCEDURE — 36415 COLL VENOUS BLD VENIPUNCTURE: CPT

## 2022-11-26 PROCEDURE — 80061 LIPID PANEL: CPT

## 2022-11-26 PROCEDURE — 82306 VITAMIN D 25 HYDROXY: CPT

## 2023-01-03 ENCOUNTER — HOSPITAL ENCOUNTER (OUTPATIENT)
Dept: ULTRASOUND IMAGING | Age: 68
Discharge: HOME | End: 2023-01-03
Attending: FAMILY MEDICINE
Payer: MEDICARE

## 2023-01-03 DIAGNOSIS — N83.202 LEFT OVARIAN CYST: ICD-10-CM

## 2023-01-03 PROCEDURE — 76830 TRANSVAGINAL US NON-OB: CPT | Performed by: FAMILY MEDICINE

## 2023-01-03 PROCEDURE — 76856 US EXAM PELVIC COMPLETE: CPT | Performed by: FAMILY MEDICINE

## 2023-01-05 DIAGNOSIS — N83.202 LEFT OVARIAN CYST: Primary | ICD-10-CM

## 2023-01-06 ENCOUNTER — HOSPITAL ENCOUNTER (OUTPATIENT)
Dept: MAMMOGRAPHY | Facility: HOSPITAL | Age: 68
Discharge: HOME | End: 2023-01-06
Attending: FAMILY MEDICINE
Payer: MEDICARE

## 2023-01-06 DIAGNOSIS — Z12.31 ENCOUNTER FOR SCREENING MAMMOGRAM FOR BREAST CANCER: ICD-10-CM

## 2023-01-06 DIAGNOSIS — Z80.3 FH: BREAST CANCER: ICD-10-CM

## 2023-01-06 PROCEDURE — 77067 SCR MAMMO BI INCL CAD: CPT | Performed by: FAMILY MEDICINE

## 2023-01-06 PROCEDURE — 77063 BREAST TOMOSYNTHESIS BI: CPT | Performed by: FAMILY MEDICINE

## 2023-01-09 ENCOUNTER — OFFICE VISIT (OUTPATIENT)
Dept: OBGYN CLINIC | Facility: CLINIC | Age: 68
End: 2023-01-09
Payer: MEDICARE

## 2023-01-09 VITALS
SYSTOLIC BLOOD PRESSURE: 109 MMHG | WEIGHT: 168 LBS | BODY MASS INDEX: 26.37 KG/M2 | HEIGHT: 67 IN | DIASTOLIC BLOOD PRESSURE: 67 MMHG

## 2023-01-09 DIAGNOSIS — R10.2 PELVIC PAIN IN FEMALE: Primary | ICD-10-CM

## 2023-01-09 PROCEDURE — 99203 OFFICE O/P NEW LOW 30 MIN: CPT | Performed by: OBSTETRICS & GYNECOLOGY

## 2023-02-17 ENCOUNTER — HOSPITAL ENCOUNTER (OUTPATIENT)
Dept: GENERAL RADIOLOGY | Facility: HOSPITAL | Age: 68
Discharge: HOME | End: 2023-02-17
Attending: ORTHOPAEDIC SURGERY
Payer: MEDICARE

## 2023-02-17 DIAGNOSIS — M25.552 LEFT HIP PAIN: ICD-10-CM

## 2023-02-17 PROCEDURE — 73502 X-RAY EXAM HIP UNI 2-3 VIEWS: CPT | Performed by: ORTHOPAEDIC SURGERY

## 2023-03-16 ENCOUNTER — HOSPITAL ENCOUNTER (OUTPATIENT)
Dept: GENERAL RADIOLOGY | Facility: HOSPITAL | Age: 68
Discharge: HOME | End: 2023-03-16
Attending: ORTHOPAEDIC SURGERY
Payer: MEDICARE

## 2023-03-16 ENCOUNTER — OFFICE VISIT (OUTPATIENT)
Dept: ORTHOPEDICS CLINIC | Facility: CLINIC | Age: 68
End: 2023-03-16

## 2023-03-16 DIAGNOSIS — M25.521 RIGHT ELBOW PAIN: ICD-10-CM

## 2023-03-16 DIAGNOSIS — M77.11 LATERAL EPICONDYLITIS OF RIGHT ELBOW: Primary | ICD-10-CM

## 2023-03-16 PROCEDURE — 99204 OFFICE O/P NEW MOD 45 MIN: CPT | Performed by: ORTHOPAEDIC SURGERY

## 2023-03-16 PROCEDURE — 73080 X-RAY EXAM OF ELBOW: CPT | Performed by: ORTHOPAEDIC SURGERY

## 2023-10-25 ENCOUNTER — OFFICE VISIT (OUTPATIENT)
Dept: FAMILY MEDICINE CLINIC | Facility: CLINIC | Age: 68
End: 2023-10-25

## 2023-10-25 VITALS
DIASTOLIC BLOOD PRESSURE: 66 MMHG | BODY MASS INDEX: 27.47 KG/M2 | WEIGHT: 175 LBS | SYSTOLIC BLOOD PRESSURE: 98 MMHG | HEIGHT: 67 IN | HEART RATE: 72 BPM

## 2023-10-25 DIAGNOSIS — R10.13 EPIGASTRIC ABDOMINAL PAIN: ICD-10-CM

## 2023-10-25 DIAGNOSIS — Z79.01 ANTICOAGULATED: ICD-10-CM

## 2023-10-25 DIAGNOSIS — R07.9 LEFT-SIDED CHEST PAIN: Primary | ICD-10-CM

## 2023-10-25 DIAGNOSIS — K21.9 GASTROESOPHAGEAL REFLUX DISEASE, UNSPECIFIED WHETHER ESOPHAGITIS PRESENT: ICD-10-CM

## 2023-10-25 DIAGNOSIS — R33.9 INCOMPLETE EMPTYING OF BLADDER: ICD-10-CM

## 2023-10-25 DIAGNOSIS — N64.4 BREAST PAIN, RIGHT: ICD-10-CM

## 2023-10-25 DIAGNOSIS — E78.00 HYPERCHOLESTEREMIA: ICD-10-CM

## 2023-10-25 PROCEDURE — 99214 OFFICE O/P EST MOD 30 MIN: CPT | Performed by: FAMILY MEDICINE

## 2023-10-26 ENCOUNTER — EKG ENCOUNTER (OUTPATIENT)
Dept: LAB | Age: 68
End: 2023-10-26
Attending: FAMILY MEDICINE

## 2023-10-26 ENCOUNTER — HOSPITAL ENCOUNTER (OUTPATIENT)
Dept: GENERAL RADIOLOGY | Age: 68
Discharge: HOME | End: 2023-10-26
Attending: FAMILY MEDICINE

## 2023-10-26 DIAGNOSIS — R07.9 LEFT-SIDED CHEST PAIN: ICD-10-CM

## 2023-10-26 LAB
ATRIAL RATE: 56 BPM
P AXIS: 71 DEGREES
P-R INTERVAL: 164 MS
Q-T INTERVAL: 446 MS
QRS DURATION: 90 MS
QTC CALCULATION (BEZET): 430 MS
R AXIS: 64 DEGREES
T AXIS: 57 DEGREES
VENTRICULAR RATE: 56 BPM

## 2023-10-26 PROCEDURE — 71046 X-RAY EXAM CHEST 2 VIEWS: CPT | Performed by: FAMILY MEDICINE

## 2023-10-26 PROCEDURE — 93010 ELECTROCARDIOGRAM REPORT: CPT | Performed by: INTERNAL MEDICINE

## 2023-10-26 PROCEDURE — 93005 ELECTROCARDIOGRAM TRACING: CPT

## 2023-10-27 ENCOUNTER — LAB ENCOUNTER (OUTPATIENT)
Dept: LAB | Age: 68
End: 2023-10-27
Attending: FAMILY MEDICINE

## 2023-10-27 ENCOUNTER — PATIENT MESSAGE (OUTPATIENT)
Dept: FAMILY MEDICINE CLINIC | Facility: CLINIC | Age: 68
End: 2023-10-27

## 2023-10-27 DIAGNOSIS — R10.13 EPIGASTRIC ABDOMINAL PAIN: ICD-10-CM

## 2023-10-27 DIAGNOSIS — R33.9 INCOMPLETE EMPTYING OF BLADDER: ICD-10-CM

## 2023-10-27 DIAGNOSIS — E78.00 HYPERCHOLESTEREMIA: ICD-10-CM

## 2023-10-27 DIAGNOSIS — R73.9 HYPERGLYCEMIA: ICD-10-CM

## 2023-10-27 DIAGNOSIS — Z79.01 ANTICOAGULATED: ICD-10-CM

## 2023-10-27 LAB
ALBUMIN SERPL-MCNC: 3.8 G/DL (ref 3.4–5)
ALBUMIN/GLOB SERPL: 1.2 {RATIO} (ref 1–2)
ALP LIVER SERPL-CCNC: 51 U/L
ALT SERPL-CCNC: 19 U/L
ANION GAP SERPL CALC-SCNC: 3 MMOL/L (ref 0–18)
AST SERPL-CCNC: 21 U/L (ref 15–37)
BASOPHILS # BLD AUTO: 0.03 X10(3) UL (ref 0–0.2)
BASOPHILS NFR BLD AUTO: 0.5 %
BILIRUB SERPL-MCNC: 0.5 MG/DL (ref 0.1–2)
BILIRUB UR QL: NEGATIVE
BUN BLD-MCNC: 15 MG/DL (ref 7–18)
BUN/CREAT SERPL: 16.9 (ref 10–20)
CALCIUM BLD-MCNC: 8.9 MG/DL (ref 8.5–10.1)
CHLORIDE SERPL-SCNC: 112 MMOL/L (ref 98–112)
CHOLEST SERPL-MCNC: 192 MG/DL (ref ?–200)
CLARITY UR: CLEAR
CO2 SERPL-SCNC: 28 MMOL/L (ref 21–32)
CREAT BLD-MCNC: 0.89 MG/DL
DEPRECATED RDW RBC AUTO: 39.8 FL (ref 35.1–46.3)
EGFRCR SERPLBLD CKD-EPI 2021: 71 ML/MIN/1.73M2 (ref 60–?)
EOSINOPHIL # BLD AUTO: 0.13 X10(3) UL (ref 0–0.7)
EOSINOPHIL NFR BLD AUTO: 2.3 %
ERYTHROCYTE [DISTWIDTH] IN BLOOD BY AUTOMATED COUNT: 11.9 % (ref 11–15)
FASTING PATIENT LIPID ANSWER: YES
FASTING STATUS PATIENT QL REPORTED: YES
GLOBULIN PLAS-MCNC: 3.2 G/DL (ref 2.8–4.4)
GLUCOSE BLD-MCNC: 105 MG/DL (ref 70–99)
GLUCOSE UR-MCNC: NORMAL MG/DL
HCT VFR BLD AUTO: 42 %
HDLC SERPL-MCNC: 77 MG/DL (ref 40–59)
HGB BLD-MCNC: 13.8 G/DL
HGB UR QL STRIP.AUTO: NEGATIVE
IMM GRANULOCYTES # BLD AUTO: 0.01 X10(3) UL (ref 0–1)
IMM GRANULOCYTES NFR BLD: 0.2 %
KETONES UR-MCNC: NEGATIVE MG/DL
LDLC SERPL CALC-MCNC: 104 MG/DL (ref ?–100)
LEUKOCYTE ESTERASE UR QL STRIP.AUTO: NEGATIVE
LIPASE SERPL-CCNC: 25 U/L (ref 13–75)
LYMPHOCYTES # BLD AUTO: 1.78 X10(3) UL (ref 1–4)
LYMPHOCYTES NFR BLD AUTO: 32.1 %
MCH RBC QN AUTO: 30.1 PG (ref 26–34)
MCHC RBC AUTO-ENTMCNC: 32.9 G/DL (ref 31–37)
MCV RBC AUTO: 91.7 FL
MONOCYTES # BLD AUTO: 0.46 X10(3) UL (ref 0.1–1)
MONOCYTES NFR BLD AUTO: 8.3 %
NEUTROPHILS # BLD AUTO: 3.13 X10 (3) UL (ref 1.5–7.7)
NEUTROPHILS # BLD AUTO: 3.13 X10(3) UL (ref 1.5–7.7)
NEUTROPHILS NFR BLD AUTO: 56.6 %
NITRITE UR QL STRIP.AUTO: NEGATIVE
NONHDLC SERPL-MCNC: 115 MG/DL (ref ?–130)
OSMOLALITY SERPL CALC.SUM OF ELEC: 297 MOSM/KG (ref 275–295)
PH UR: 6 [PH] (ref 5–8)
PLATELET # BLD AUTO: 209 10(3)UL (ref 150–450)
POTASSIUM SERPL-SCNC: 4.5 MMOL/L (ref 3.5–5.1)
PROT SERPL-MCNC: 7 G/DL (ref 6.4–8.2)
PROT UR-MCNC: NEGATIVE MG/DL
RBC # BLD AUTO: 4.58 X10(6)UL
SODIUM SERPL-SCNC: 143 MMOL/L (ref 136–145)
SP GR UR STRIP: 1.01 (ref 1–1.03)
TRIGL SERPL-MCNC: 60 MG/DL (ref 30–149)
UROBILINOGEN UR STRIP-ACNC: NORMAL
VLDLC SERPL CALC-MCNC: 10 MG/DL (ref 0–30)
WBC # BLD AUTO: 5.5 X10(3) UL (ref 4–11)

## 2023-10-27 PROCEDURE — 80061 LIPID PANEL: CPT

## 2023-10-27 PROCEDURE — 83036 HEMOGLOBIN GLYCOSYLATED A1C: CPT

## 2023-10-27 PROCEDURE — 83690 ASSAY OF LIPASE: CPT

## 2023-10-27 PROCEDURE — 85025 COMPLETE CBC W/AUTO DIFF WBC: CPT

## 2023-10-27 PROCEDURE — 81003 URINALYSIS AUTO W/O SCOPE: CPT

## 2023-10-27 PROCEDURE — 80053 COMPREHEN METABOLIC PANEL: CPT

## 2023-10-27 PROCEDURE — 36415 COLL VENOUS BLD VENIPUNCTURE: CPT

## 2023-10-27 PROCEDURE — 87086 URINE CULTURE/COLONY COUNT: CPT

## 2023-10-28 DIAGNOSIS — R73.9 HYPERGLYCEMIA: Primary | ICD-10-CM

## 2023-10-28 LAB
EST. AVERAGE GLUCOSE BLD GHB EST-MCNC: 111 MG/DL (ref 68–126)
HBA1C MFR BLD: 5.5 % (ref ?–5.7)

## 2023-10-29 NOTE — TELEPHONE ENCOUNTER
From: Manda Patel  To: Hawa Martines  Sent: 10/27/2023 3:15 PM CDT  Subject: Appointment needed    Hello,I want an appointment to cardiologist to discuss results of my test.

## 2023-11-01 ENCOUNTER — TELEPHONE (OUTPATIENT)
Dept: FAMILY MEDICINE CLINIC | Facility: CLINIC | Age: 68
End: 2023-11-01

## 2023-11-01 DIAGNOSIS — R07.9 LEFT-SIDED CHEST PAIN: Primary | ICD-10-CM

## 2023-11-01 NOTE — TELEPHONE ENCOUNTER
Verified name and date of birth. The patient was informed with understanding. She stated she was called earlier and has an appointment for tomorrow with cardiology. 11/2/23 .

## 2023-11-01 NOTE — TELEPHONE ENCOUNTER
The patient is asking if she can see a cardiologist.    On 10/25/23 was seen in the office for left side chest pain. The patient stated that she gets the sharp chest pains one time in 2 months. Last time was the day after her office visit on 10/25/23. She is concerned and would like to follow up with cardiology. She is asking for a cardiology referral.  Last saw Dr. Gil Done on 9/27/21    Referral pended with Dr. Gil Done. The patient stated she will see anyone you recommend. She just wants the best.  Diagnostic code is needed. Hi Manda        Your test results are within normal limits. Please follow up with our office if you have any questions or concerns. Thank you. Vaishali Smith, [address] [place] [address] Drive   Written by Lexus Rdz MD on 10/26/2023  7:13 PM CDT  Seen by patient Casey Horn on 10/26/2023  8:00 PM

## 2023-11-08 ENCOUNTER — OFFICE VISIT (OUTPATIENT)
Dept: SURGERY | Facility: CLINIC | Age: 68
End: 2023-11-08
Payer: MEDICARE

## 2023-11-08 DIAGNOSIS — R39.9 LOWER URINARY TRACT SYMPTOMS: Primary | ICD-10-CM

## 2023-11-08 PROCEDURE — 51798 US URINE CAPACITY MEASURE: CPT | Performed by: UROLOGY

## 2023-11-08 PROCEDURE — 99204 OFFICE O/P NEW MOD 45 MIN: CPT | Performed by: UROLOGY

## 2023-11-08 RX ORDER — ESTRADIOL 0.1 MG/G
CREAM VAGINAL
Qty: 42.5 G | Refills: 11 | Status: SHIPPED | OUTPATIENT
Start: 2023-11-08

## 2023-11-08 NOTE — PROGRESS NOTES
Glenn Parekh MD  Department of Urology  [facility]    T: [phone]  F: [phone]    To: [address] [place] [address]    Re: Echo Mehta   MRN: [id number]  : 1955    Dear Jluis Mishra. MD Bobbi,    Today I had the pleasure of seeing Echo Mehta in my clinic. As you know, Ms. Uli Lau is a pleasant 80year old year old female who I am seeing for urinary issues. Patient was last seen in this department on Visit date not found. Briefly, patient recently saw her primary care physician during which she complained of incomplete bladder emptying and a weak stream.  She did have a hemoglobin A1c that was 5.5% urine culture that was negative and a urine analysis that was negative as well. Her most recent creatinine is 0.89. She has not had any cross-sectional imaging since 2022. That CT scan in 2022 did not demonstrate any genitourinary abnormalities.     PVR0       PAST MEDICAL HISTORY:  Past Medical History:   Diagnosis Date    Colon polyp     Constipation     Deep vein thrombosis (HCC)     GERD (gastroesophageal reflux disease)     Hemorrhoids     Hiatal hernia     High cholesterol     Renal disorder     Visual impairment     reading glassess        PAST SURGICAL HISTORY:  Past Surgical History:   Procedure Laterality Date    COLONOSCOPY      [facility]    COLONOSCOPY N/A 2019    Procedure: COLONOSCOPY;  Surgeon: Pricilla Valdez MD;  Location: [address] [place] [address] ENDOSCOPY    EGD      [address] [place] [address]    one ovary removed/ cyst         ALLERGIES:  Allergies   Allergen Reactions    Penicillins RASH    Streptomycin RASH    Other      levomicetine         MEDICATIONS:  Current Outpatient Medications   Medication Instructions    alendronate (FOSAMAX) 70 mg, Oral, Weekly    atorvastatin (LIPITOR) 10 mg, Oral, Nightly famotidine (PEPCID) 20 mg, Oral, 2 times daily    rivaroxaban (XARELTO) 20 mg, Oral, Daily        FAMILY HISTORY:  Family History   Problem Relation Age of Onset    Cancer Father 80        Colon    Cancer Mother 71        Breast    Breast Cancer Mother 72    Other (Other) Brother 76        epilepsy    Cancer Maternal Aunt     Cancer Maternal Aunt     Blood Clotting Disorder Neg         SOCIAL HISTORY:  Social History     Socioeconomic History    Marital status:    Occupational History    Occupation: Retired    Tobacco Use    Smoking status: Never    Smokeless tobacco: Never   Vaping Use    Vaping Use: Never used   Substance and Sexual Activity    Alcohol use: No    Drug use: No   Other Topics Concern    Pt has a pacemaker No    Pt has a defibrillator No    Reaction to local anesthetic No          PHYSICAL EXAMINATION:  There were no vitals filed for this visit. CONSTITUTIONAL: No apparent distress, cooperative and communicative  NEUROLOGIC: Alert and oriented   HEAD: Normocephalic, atraumatic   EYES: Sclera non-icteric   ENT: Hearing intact, moist mucous membranes   NECK: No obvious goiter or masses   RESPIRATORY: Normal respiratory effort, Nonlabored breathing on room air  SKIN: No evident rashes   ABDOMEN: Soft, nontender, nondistended, no rebound tenderness, no guarding, no masses      REVIEW OF SYSTEMS:    A comprehensive 10-point review of systems was completed. Pertinent positives and negatives are noted in the the HPI.        LABORATORY DATA:  lucose  70 - 99 mg/dL 105 High    Sodium  136 - 145 mmol/L 143   Potassium  3.5 - 5.1 mmol/L 4.5   Chloride  98 - 112 mmol/L 112   CO2  21.0 - 32.0 mmol/L 28.0   Anion Gap  0 - 18 mmol/L 3   BUN  7 - 18 mg/dL 15   Creatinine  0.55 - 1.02 mg/dL 0.89   BUN/CREA Ratio  10.0 - 20.0 16.9   Calcium, Total  8.5 - 10.1 mg/dL 8.9   Calculated Osmolality  275 - 295 mOsm/kg 297 High    eGFR-Cr  >=60 mL/min/1.73m2 71   ALT  13 - 56 U/L 19   AST  15 - 37 U/L 21   Alkaline Phosphatase  55 - 142 U/L 51 Low    Bilirubin, Total  0.1 - 2.0 mg/dL 0.5   Total Protein  6.4 - 8.2 g/dL 7.0   Albumin  3.4 - 5.0 g/dL 3.8   Globulin  2.8 - 4.4 g/dL 3.2   A/G Ratio  1.0 - 2.0 1.2     HgbA1C  <5.7 % 5.5   Comment:  Normal HbA1C:     <5.7%   Pre-Diabetic:     5.7 - 6.4%   Diabetic:         >6.4%   Diabetic Control: <7.0%     Estimated Average Glucose  68 - 126 mg/dL 111        Urine Color  Yellow Light-Yellow   Clarity Urine  Clear Clear   Spec Gravity  1.005 - 1.030 1.011   Glucose Urine  Normal mg/dL Normal   Bilirubin Urine  Negative Negative   Ketones Urine  Negative mg/dL Negative   Blood Urine  Negative Negative   pH Urine  5.0 - 8.0 6.0   Protein Urine  Negative mg/dL Negative   Urobilinogen Urine  Normal Normal   Nitrite Urine  Negative Negative   Leukocyte Esterase Urine  Negative Negative   Microscopic Microscopic not indicated   Resulting Agency [facility])     URINE CULTURE No Growth at 18-24 hrs. IMAGING REVIEW:  Narrative   PROCEDURE:  CT ABDOMEN+PELVIS (CONTRAST ONLY) (FCK=[id number])     COMPARISON:  [facility], CT CARDIAC OVER READ, 10/19/2021, 8:48 AM.     INDICATIONS:  R10.12 LUQ abdominal pain R10.13 Epigastric pain     TECHNIQUE:  CT scanning was performed from the dome of the diaphragm to the pubic symphysis with non-ionic intravenous contrast material. Post contrast coronal MPR imaging was performed. Dose reduction techniques were used. Dose information is  transmitted to the [facility] [employer] of [facility]) NRDR ([address]) which includes the Dose Index Registry. PATIENT STATED HISTORY:(As transcribed by Technologist)  Epigastric pain that radiates to LUQ intermittent for 1-2 years. family history of pancreatic cancer. Possible previous kidney lesion. CONTRAST USED:  100cc of Omnipaque 350     FINDINGS:    LIVER:  No enlargement, atrophy, abnormal density, or significant focal lesion.     BILIARY: No visible dilatation or calcification. PANCREAS:  No lesion, fluid collection, ductal dilatation, or atrophy. SPLEEN:  No enlargement or focal lesion. KIDNEYS:  No mass, obstruction, or calcification. ADRENALS:  No mass or enlargement. AORTA/VASCULAR:  No aneurysm or dissection. RETROPERITONEUM:  No mass or adenopathy. BOWEL/MESENTERY:  No visible mass, obstruction, or bowel wall thickening. Moderate amount of stool with uncomplicated left colon and sigmoid colon diverticulosis. Normal appendix. ABDOMINAL WALL:  No mass or hernia. URINARY BLADDER:  No visible focal wall thickening, lesion, or calculus. PELVIC NODES:  No adenopathy. PELVIC ORGANS:  No visible mass. Pelvic organs appropriate for patient age. BONES:  No bony lesion or fracture. Mild levoscoliosis. Degenerative disc disease most severe L5-S1  LUNG BASES:  Minimal scarring versus discoid atelectasis in the right middle lobe and left lower lobe. OTHER:  Negative. Impression   CONCLUSION:  No acute disease. Specifically, normal appearing kidneys and pancreas. Moderate amount of stool within the colon without obstruction. Dictated by (CST): Karthik Burleson MD on 2/09/2022 at 10:26 AM      Finalized by (CST): Karthik Burleson MD on 2/09/2022 at 10:36 AM          OTHER RELEVANT DATA:   none     IMPRESSION: Lower urinary tract symptoms-recommended behavioral management and Estrace cream.  Offered cystoscopy and pelvic examination. Patient would like to proceed with estrace, cystoscopy and pelvic exam.    Behavioral management includes timed voiding (going to the bathroom on a schedule every 1-4 hours), double voiding (trying to pee twice), avoiding bladder irritants (coffee, tea, soda, pop, alcohol), compression stockings, elevation of feet, voiding prior to bedtime, avoiding fluids 2 to 4 hours before bedtime and constipation management.      For constipation management she can consider fruits (especially ones that start with \"P\"), vegetables, flaxseeds, hydration, fiber, MiraLAX, Colace or senna. She can also use a squatty potty to help with efficiency of stooling. I also suggested she try Estrace cream every night for 1 week followed by every other night indefinitely for genitourinary syndrome of menopause. This may help with her urgency, frequency and urinary incontinence and help prevent recurrent urinary tract infections. She knows that behavioral management and Estrace cream can take 6 to 12 months to work. It may not be a complete solution but should improve her symptoms. If she has absolutely no improvement in her symptoms, we can proceed with the evaluation test including a cystoscopy, pelvic examination and possible urodynamic evaluation. PLAN:  Cysto  Pelvic exam  Behavioral management  Estrace cream   CTU    Thank you for referring this very pleasant patient to my clinic. If you have any questions or concerns, please do not hesitate to contact me. Sincerely,  Conrado Severs, MD    30 minutes were spent on this patient at this visit obtaining a history, reviewing medical records, developing a treatment plan, counseling and discussing treatment strategy with patient, coordination of care and documentation. The [facility] makes medical notes available to patients in the interest of transparency. However, please be advised that this is a medical document. It is intended as a peer to peer communication. It is written in medical language and may contain abbreviations or verbiage that are technical and unfamiliar. It may appear blunt or direct. Medical documents are intended to carry relevant information, facts as evident, and the clinical opinion of the practitioner.

## 2023-11-27 ENCOUNTER — OFFICE VISIT (OUTPATIENT)
Dept: FAMILY MEDICINE CLINIC | Facility: CLINIC | Age: 68
End: 2023-11-27
Payer: MEDICARE

## 2023-11-27 VITALS
BODY MASS INDEX: 27.62 KG/M2 | HEIGHT: 67 IN | HEART RATE: 61 BPM | DIASTOLIC BLOOD PRESSURE: 70 MMHG | WEIGHT: 176 LBS | SYSTOLIC BLOOD PRESSURE: 110 MMHG | OXYGEN SATURATION: 95 %

## 2023-11-27 DIAGNOSIS — N83.202 LEFT OVARIAN CYST: ICD-10-CM

## 2023-11-27 DIAGNOSIS — Z78.0 POSTMENOPAUSAL: ICD-10-CM

## 2023-11-27 DIAGNOSIS — E78.00 HYPERCHOLESTEREMIA: ICD-10-CM

## 2023-11-27 DIAGNOSIS — M81.0 OSTEOPOROSIS, UNSPECIFIED OSTEOPOROSIS TYPE, UNSPECIFIED PATHOLOGICAL FRACTURE PRESENCE: ICD-10-CM

## 2023-11-27 DIAGNOSIS — Z90.710 HISTORY OF HYSTERECTOMY FOR BENIGN DISEASE: ICD-10-CM

## 2023-11-27 DIAGNOSIS — M54.2 NECK PAIN ON RIGHT SIDE: ICD-10-CM

## 2023-11-27 DIAGNOSIS — D22.9 MULTIPLE NEVI: ICD-10-CM

## 2023-11-27 DIAGNOSIS — Z00.00 ENCOUNTER FOR ANNUAL HEALTH EXAMINATION: Primary | ICD-10-CM

## 2023-11-27 DIAGNOSIS — Z79.01 ANTICOAGULATED: ICD-10-CM

## 2023-11-27 DIAGNOSIS — R07.2 SUBSTERNAL CHEST PAIN: ICD-10-CM

## 2023-11-27 DIAGNOSIS — K21.9 GASTROESOPHAGEAL REFLUX DISEASE, UNSPECIFIED WHETHER ESOPHAGITIS PRESENT: ICD-10-CM

## 2023-11-27 DIAGNOSIS — Z86.718 HISTORY OF RECURRENT DEEP VEIN THROMBOSIS: ICD-10-CM

## 2023-12-01 ENCOUNTER — OFFICE VISIT (OUTPATIENT)
Dept: HEMATOLOGY/ONCOLOGY | Facility: HOSPITAL | Age: 68
End: 2023-12-01
Attending: INTERNAL MEDICINE
Payer: MEDICARE

## 2023-12-01 ENCOUNTER — HOSPITAL ENCOUNTER (OUTPATIENT)
Dept: CT IMAGING | Age: 68
Discharge: HOME | End: 2023-12-01
Attending: UROLOGY
Payer: MEDICARE

## 2023-12-01 ENCOUNTER — TELEPHONE (OUTPATIENT)
Dept: HEMATOLOGY/ONCOLOGY | Facility: HOSPITAL | Age: 68
End: 2023-12-01

## 2023-12-01 VITALS
BODY MASS INDEX: 27.47 KG/M2 | DIASTOLIC BLOOD PRESSURE: 69 MMHG | HEART RATE: 70 BPM | HEIGHT: 67 IN | WEIGHT: 175 LBS | SYSTOLIC BLOOD PRESSURE: 117 MMHG | TEMPERATURE: 99 F | RESPIRATION RATE: 16 BRPM | OXYGEN SATURATION: 97 %

## 2023-12-01 DIAGNOSIS — Z86.718 HISTORY OF RECURRENT DEEP VEIN THROMBOSIS: Primary | ICD-10-CM

## 2023-12-01 DIAGNOSIS — Z79.01 ANTICOAGULATION MANAGEMENT ENCOUNTER: ICD-10-CM

## 2023-12-01 DIAGNOSIS — R39.9 LOWER URINARY TRACT SYMPTOMS: ICD-10-CM

## 2023-12-01 DIAGNOSIS — Z51.81 ANTICOAGULATION MANAGEMENT ENCOUNTER: ICD-10-CM

## 2023-12-01 LAB
CREAT BLD-MCNC: 0.9 MG/DL
EGFRCR SERPLBLD CKD-EPI 2021: 70 ML/MIN/1.73M2 (ref 60–?)

## 2023-12-01 PROCEDURE — 99214 OFFICE O/P EST MOD 30 MIN: CPT | Performed by: INTERNAL MEDICINE

## 2023-12-01 PROCEDURE — 74178 CT ABD&PLV WO CNTR FLWD CNTR: CPT | Performed by: UROLOGY

## 2023-12-01 PROCEDURE — 82565 ASSAY OF CREATININE: CPT

## 2023-12-01 NOTE — TELEPHONE ENCOUNTER
I spoke with patient this morning to ask if she wanted to see Dr Zan Snellen since she had seen her in 2021. Patient said no she wants to see Dr Fahad Beatty for second opinion and does not want to change appointment.  nadeen

## 2023-12-19 ENCOUNTER — PROCEDURE (OUTPATIENT)
Dept: SURGERY | Facility: CLINIC | Age: 68
End: 2023-12-19
Payer: MEDICARE

## 2023-12-19 VITALS — DIASTOLIC BLOOD PRESSURE: 77 MMHG | HEART RATE: 76 BPM | SYSTOLIC BLOOD PRESSURE: 110 MMHG

## 2023-12-19 DIAGNOSIS — R39.9 LOWER URINARY TRACT SYMPTOMS: Primary | ICD-10-CM

## 2023-12-19 PROCEDURE — 52000 CYSTOURETHROSCOPY: CPT | Performed by: UROLOGY

## 2023-12-19 RX ORDER — ESTRADIOL 0.1 MG/G
CREAM VAGINAL
Qty: 42.5 G | Refills: 11 | Status: SHIPPED | OUTPATIENT
Start: 2023-12-19

## 2023-12-19 NOTE — PROCEDURES
CYSTOSCOPY (FEMALE)    PRE-OP DIAGNOSIS: LUTS    POST-OP DIAGNOSIS: same    PROCEDURE: Cystsocopy    SURGEON: Les Monterroso MD    ASSISTANT: none     EBL: minimal    FINDINGS:   Urethra: No urethral lesions, no urethral strictures  Bladder: Bilateral ureteral orifices in orthotopic position with efflux, no suspicious or concerning erythematous lesions, no papillary bladder tumors, no stones   Retroflexion: no abnormalities   Other findings: no significant prolapse    INDICATIONS: LUTS    PROCEDURE: Patient was brought to the procedure suite and a timeout was performed identifying the patient,  and procedure being performed. The risks of the procedure were once again detailed to the patient including bleeding, infection, dysuria. The patient agreed to proceed. The patient had a negative urinalysis. No antibiotics were given to patient prior to this procedure. She was placed in a supine position on the table and a flexible cystoscope was inserted per urethra. There were no obvious urethral lesions or strictures. Once in the bladder we performed a full diagnostic/surveillance cystoscopy which demonstrated no abnormalities. On retroflexion we noted no abnormalities. The scope was then carefully removed and once again no urethral abnormalities were noted. There were no complications after this procedure and the patient tolerated the procedure without issue. IMPRESSION: cysto negative. Behavioral management. CTU negative.      PLAN:    Continue estrace cream  Continue behavioral management  RTC prn

## 2024-01-03 ENCOUNTER — ORDER TRANSCRIPTION (OUTPATIENT)
Dept: ADMINISTRATIVE | Facility: HOSPITAL | Age: 69
End: 2024-01-03

## 2024-01-03 DIAGNOSIS — E78.00 HYPERCHOLESTEREMIA: Primary | ICD-10-CM

## 2024-01-03 DIAGNOSIS — R00.2 PALPITATIONS: ICD-10-CM

## 2024-01-04 ENCOUNTER — OFFICE VISIT (OUTPATIENT)
Dept: DERMATOLOGY CLINIC | Facility: CLINIC | Age: 69
End: 2024-01-04

## 2024-01-04 DIAGNOSIS — L82.1 SEBORRHEIC KERATOSES: ICD-10-CM

## 2024-01-04 DIAGNOSIS — D22.9 MULTIPLE BENIGN NEVI: ICD-10-CM

## 2024-01-04 DIAGNOSIS — L81.4 LENTIGINES: ICD-10-CM

## 2024-01-04 DIAGNOSIS — D23.9 SYRINGOMA: ICD-10-CM

## 2024-01-04 DIAGNOSIS — D18.01 CHERRY ANGIOMA: ICD-10-CM

## 2024-01-04 DIAGNOSIS — L82.0 SEBORRHEIC KERATOSES, INFLAMED: Primary | ICD-10-CM

## 2024-01-04 PROCEDURE — 99213 OFFICE O/P EST LOW 20 MIN: CPT | Performed by: STUDENT IN AN ORGANIZED HEALTH CARE EDUCATION/TRAINING PROGRAM

## 2024-01-04 NOTE — PROGRESS NOTES
January 4, 2024    Established patient     CHIEF COMPLAINT: FBSE    HISTORY OF PRESENT ILLNESS: .    1. Growth  Location: Right Anterior Neck   Duration: Many years  Signs and symptoms: Growing, pink  Current treatment: None  Past treatments: None    2. Growths  Location: Around eyes  Duration: Several years  Signs and symptoms: Spreading  Current treatment: None   Past treatments: None      DERM HISTORY:  History of skin cancer: No    FAMILY HISTORY:  History of melanoma: No    PAST MEDICAL HISTORY:  Past Medical History:   Diagnosis Date    Colon polyp 2019    Constipation     Deep vein thrombosis (HCC)     GERD (gastroesophageal reflux disease)     Hemorrhoids     Hiatal hernia     High cholesterol     Renal disorder     Visual impairment     reading glassess       REVIEW OF SYSTEMS:  Constitutional: Denies fever, chills, unintentional weight loss.   Skin as per HPI    Medications  Current Outpatient Medications   Medication Sig Dispense Refill    estradiol (ESTRACE) 0.1 MG/GM Vaginal Cream Apply fingertip amount of cream to the vagina (0.5-1g) every night for 1 week and then every other night indefinitely 42.5 g 11    rivaroxaban 10 MG Oral Tab Take 1 tablet (10 mg total) by mouth daily with food. 90 tablet 3    alendronate (FOSAMAX) 70 MG Oral Tab Take 1 tablet (70 mg total) by mouth once a week. 12 tablet 3    atorvastatin 10 MG Oral Tab Take 1 tablet (10 mg total) by mouth nightly.         PHYSICAL EXAM:  Patient declined chaperone   General: awake, alert, no acute distress  Skin: Skin exam was performed today including the following: head and face, scalp, neck, chest (including breasts and axillae), abdomen, back, bilateral upper extremities, bilateral lower extremities, hands, feet, digits, nails. Pertinent findings include:   - Scattered bright red-purple dome-shaped papules on the trunk and extremities   - Scattered light brown stellate macules on sun exposed sites  - Scattered, evenly colored, round  brown macules and papules with regular borders on the trunk and extremities  - Numerous scattered skin-colored and brown, waxy, stuck-on papules and plaques on the trunk and extremities      ASSESSMENT & PLAN:  Pathophysiology of diagnoses discussed with patient.  Therapeutic options reviewed. Risks, benefits, and alternatives discussed with patient. Instructions reviewed at length.    #Lentigines  #Seborrheic keratoses   #Cherry angiomas   - Reassurance provided regarding the benign nature of these lesions.    #Multiple benign nevi  - Complete skin exam performed today with no outlier lesions identified   - Reassured patient of benign nature of these lesions.   - Recommend daily photoprotection with broad-spectrum sunscreen, avoidance of sun during peak hours, and sun protective clothing.    - Dermoscopy was used for physical examination of pigmented lesions during today's office visit.    #Syringomas  - Reassured regarding benign nature. No treatment necessary unless symptomatic/changing.  - Cautery, low, blunt tip at 2.0 to lesions on lower eyelids - N/C  Risks (including, but not limited to scarring, pain, bleeding, infection, dyschromia) benefits, alternatives and personnel discussed with patient who consents to proceed. Vaseline applied after    #Inflamed seborrheic keratosis - N/C  - Reassured regarding benign nature of lesion   - Cryotherapy today. Medically necessary as lesion inflamed and irritated.    - Cryosurgery of non-malignant lesion(s)  - Risks, benefits, alternatives and personnel required for cryosurgery reviewed with patient. Pt verbalizes understanding and wishes to proceed.   - Cryosurgery performed with Liquid Nitrogen via cryostat spray gun to ISK. 3 lesion(s) treated.   - Patient tolerated well and wound care discussed.     Return to clinic: 1 year  or sooner if something concerning arises     El Bowers MD

## 2024-02-05 ENCOUNTER — HOSPITAL ENCOUNTER (OUTPATIENT)
Dept: CT IMAGING | Facility: HOSPITAL | Age: 69
Discharge: HOME | End: 2024-02-05
Attending: INTERNAL MEDICINE
Payer: MEDICARE

## 2024-02-05 VITALS — HEIGHT: 67 IN | WEIGHT: 175 LBS | BODY MASS INDEX: 27.47 KG/M2

## 2024-02-05 DIAGNOSIS — E78.00 HYPERCHOLESTEREMIA: ICD-10-CM

## 2024-02-05 DIAGNOSIS — R00.2 PALPITATIONS: ICD-10-CM

## 2024-02-05 LAB
CREAT BLD-MCNC: 0.8 MG/DL
EGFRCR SERPLBLD CKD-EPI 2021: 81 ML/MIN/1.73M2 (ref 60–?)

## 2024-02-05 PROCEDURE — 82565 ASSAY OF CREATININE: CPT

## 2024-02-05 PROCEDURE — 75574 CT ANGIO HRT W/3D IMAGE: CPT | Performed by: INTERNAL MEDICINE

## 2024-02-05 RX ORDER — METOPROLOL TARTRATE 1 MG/ML
5 INJECTION, SOLUTION INTRAVENOUS SEE ADMIN INSTRUCTIONS
Status: DISCONTINUED | OUTPATIENT
Start: 2024-02-05 | End: 2024-02-07

## 2024-02-05 RX ORDER — DILTIAZEM HYDROCHLORIDE 5 MG/ML
5 INJECTION INTRAVENOUS SEE ADMIN INSTRUCTIONS
Status: DISCONTINUED | OUTPATIENT
Start: 2024-02-05 | End: 2024-02-07

## 2024-02-05 RX ORDER — NITROGLYCERIN 0.4 MG/1
TABLET SUBLINGUAL
Status: DISPENSED
Start: 2024-02-05 | End: 2024-02-05

## 2024-02-05 RX ORDER — METOPROLOL TARTRATE 1 MG/ML
INJECTION, SOLUTION INTRAVENOUS
Status: DISPENSED
Start: 2024-02-05 | End: 2024-02-05

## 2024-02-05 RX ORDER — NITROGLYCERIN 0.4 MG/1
0.4 TABLET SUBLINGUAL ONCE
Status: COMPLETED | OUTPATIENT
Start: 2024-02-05 | End: 2024-02-05

## 2024-02-05 RX ADMIN — NITROGLYCERIN 0.4 MG: 0.4 TABLET SUBLINGUAL at 09:27:00

## 2024-02-05 RX ADMIN — METOPROLOL TARTRATE 5 MG: 1 INJECTION, SOLUTION INTRAVENOUS at 09:21:00

## 2024-02-05 NOTE — IMAGING NOTE
TO RAD HOLDING AT 0850      HX TAKEN: chest pains     PT CONSENTED AT 0857      BASELINE VITAL SIGNS: HR 64   /64 BMI 27.4    CTA ORDERED BY DR RODRIGUEZ  WAS PT GIVEN CTA PREMEDS NO      18 GAUGE IV STARTED AT 0910  POC TESTING COMPLETED GFR = 81   CREATINE = 0.8    0920: HR 61  /62  METOPROLOL 5 MILLIGRAMS GIVEN IV PUSH  SEE  PROTOCOL    TO CT TABLE @ 0925     CONNECT TO MONITOR  HR51 / 68    NITROGLYCERIN 0.4 MILLIGRAMS SUBLINGUAL GIVEN AT 0927      CALCIUM SCORE COMPLETED AT 0930     INJECTION STARTED AT 0931  HR 53  DURING SCAN PROCEDURE COMPLETE    POST SCAN HR 58  BP 98/60  AT 0935     PT TO HOLDING AREA  VS /63 HR 57 Q 15 MIN X2     1000 VS /65 HR 46     AVS  PROVIDED      1004 DISCHARGED HOME

## 2024-02-08 ENCOUNTER — HOSPITAL ENCOUNTER (OUTPATIENT)
Dept: BONE DENSITY | Age: 69
Discharge: HOME | End: 2024-02-08
Attending: FAMILY MEDICINE
Payer: MEDICARE

## 2024-02-08 DIAGNOSIS — M81.0 OSTEOPOROSIS, UNSPECIFIED OSTEOPOROSIS TYPE, UNSPECIFIED PATHOLOGICAL FRACTURE PRESENCE: ICD-10-CM

## 2024-02-08 DIAGNOSIS — Z78.0 POSTMENOPAUSAL: ICD-10-CM

## 2024-02-08 PROCEDURE — 77080 DXA BONE DENSITY AXIAL: CPT | Performed by: FAMILY MEDICINE

## 2024-02-28 ENCOUNTER — TELEPHONE (OUTPATIENT)
Dept: FAMILY MEDICINE CLINIC | Facility: CLINIC | Age: 69
End: 2024-02-28

## 2024-02-28 DIAGNOSIS — N83.202 LEFT OVARIAN CYST: Primary | ICD-10-CM

## 2024-02-28 DIAGNOSIS — M85.80 OSTEOPENIA, UNSPECIFIED LOCATION: ICD-10-CM

## 2024-02-28 NOTE — TELEPHONE ENCOUNTER
US Pelvis pending if appropriate      Vaishali Martines MD  1/5/2023 10:17 PM CST       Uterus and right ovary are absent.  Small less than 1 cm cyst noted in left ovary appears to be benign.  Recommend 1 year follow-up with pelvic ultrasound.  Results released through My Chart.

## 2024-02-28 NOTE — TELEPHONE ENCOUNTER
Patient called requesting that a new order is placed for the following test:    US PELVIS (TRANSABDOMINAL AND TRANSVAGINAL) (CPT= 44495/ 38080) ([id number]) [[id number]] (ORDER [id number]).    Per patient she did not complete the test on time and the order is . Test was ordered on 2023.

## 2024-02-29 RX ORDER — ALENDRONATE SODIUM 70 MG/1
70 TABLET ORAL WEEKLY
Qty: 12 TABLET | Refills: 3 | Status: SHIPPED
Start: 2024-02-29 | End: 2024-04-09

## 2024-02-29 NOTE — TELEPHONE ENCOUNTER
Refill passed per [facility] protocol.   Requested Prescriptions   Pending Prescriptions Disp Refills    alendronate (FOSAMAX) 70 MG Oral Tab 12 tablet 3     Sig: Take 1 tablet (70 mg total) by mouth once a week.       Osteoporosis Medication Protocol Passed - 2/28/2024  3:19 PM        Passed - In person appointment or virtual visit in the past 6 mos or appointment in next 3 mos     Recent Outpatient Visits              1 month ago Seborrheic keratoses[facility]    [facility]    3 months ago History of recurrent deep vein thrombosis    [facility] Hematology Oncology Von Duque MD    Office Visit    3 months ago Encounter for annual health examination    [facility]    [facility]    3 months ago Lower urinary tract symptoms    [facility]    Office Visit    4 months ago Left-sided chest pain    [facility]          Future Appointments         Provider Department Appt Notes    In 9 months Von Duque MD [facility] Hematology Oncology ANNUAL f/u caf               Passed - DEXA scan within past 2 years        Passed - CMP within the past 12 months        Passed - Calcium level between 8.3 and 10.3     Lab Results   Component Value Date    CA 8.9 10/27/2023               Passed - GFR level greater than 35     GFR Evaluation  EGFRCR: 81 , resulted on 2/5/2024              Recent Outpatient Visits              1 month ago Seborrheic keratoses, inflamed    [facility]    Office Visit    3 months ago History of recurrent deep vein thrombosis    [facility] Hematology Oncology Von Duque MD    Office Visit    3 months ago Encounter for annual  health examination    [facility], Vaishali Jimenez MD    Office Visit    3 months ago Lower urinary tract symptoms    [facility]    Office Visit    4 months ago Left-sided chest pain    [facility], Vaishali Jimenez MD    Office Visit           Future Appointments         Provider Department Appt Notes    In 9 months Von Duque MD [facility] Hematology Oncology ANNUAL f/u caf

## 2024-03-14 ENCOUNTER — HOSPITAL ENCOUNTER (OUTPATIENT)
Dept: MAMMOGRAPHY | Facility: HOSPITAL | Age: 69
Discharge: HOME | End: 2024-03-14
Attending: FAMILY MEDICINE
Payer: MEDICARE

## 2024-03-14 DIAGNOSIS — N64.4 BREAST PAIN, RIGHT: ICD-10-CM

## 2024-03-14 PROCEDURE — 77062 BREAST TOMOSYNTHESIS BI: CPT | Performed by: FAMILY MEDICINE

## 2024-03-14 PROCEDURE — 77066 DX MAMMO INCL CAD BI: CPT | Performed by: FAMILY MEDICINE

## 2024-03-22 ENCOUNTER — HOSPITAL ENCOUNTER (OUTPATIENT)
Dept: ULTRASOUND IMAGING | Facility: HOSPITAL | Age: 69
Discharge: HOME | End: 2024-03-22
Attending: FAMILY MEDICINE
Payer: MEDICARE

## 2024-03-22 DIAGNOSIS — N83.202 LEFT OVARIAN CYST: ICD-10-CM

## 2024-03-22 PROCEDURE — 76856 US EXAM PELVIC COMPLETE: CPT | Performed by: FAMILY MEDICINE

## 2024-03-22 PROCEDURE — 76830 TRANSVAGINAL US NON-OB: CPT | Performed by: FAMILY MEDICINE

## 2024-04-09 RX ORDER — ALENDRONATE SODIUM 70 MG/1
70 TABLET ORAL WEEKLY
Qty: 12 TABLET | Refills: 3 | Status: SHIPPED | OUTPATIENT
Start: 2024-04-09

## 2024-04-09 NOTE — TELEPHONE ENCOUNTER
Patient calling to state medication was not received by pharmacy, stated has to go to [facility] in [facility], not the one in [place].

## 2024-05-03 ENCOUNTER — NURSE TRIAGE (OUTPATIENT)
Dept: FAMILY MEDICINE CLINIC | Facility: CLINIC | Age: 69
End: 2024-05-03

## 2024-05-03 ENCOUNTER — APPOINTMENT (OUTPATIENT)
Dept: ULTRASOUND IMAGING | Facility: HOSPITAL | Age: 69
End: 2024-05-03
Payer: MEDICARE

## 2024-05-03 ENCOUNTER — HOSPITAL ENCOUNTER (EMERGENCY)
Facility: HOSPITAL | Age: 69
Discharge: HOME | End: 2024-05-03
Attending: EMERGENCY MEDICINE
Payer: MEDICARE

## 2024-05-03 VITALS
OXYGEN SATURATION: 99 % | RESPIRATION RATE: 18 BRPM | HEART RATE: 64 BPM | TEMPERATURE: 98 F | DIASTOLIC BLOOD PRESSURE: 84 MMHG | SYSTOLIC BLOOD PRESSURE: 118 MMHG

## 2024-05-03 DIAGNOSIS — M54.32 SCIATICA OF LEFT SIDE: Primary | ICD-10-CM

## 2024-05-03 PROCEDURE — 93970 EXTREMITY STUDY: CPT

## 2024-05-03 PROCEDURE — 99284 EMERGENCY DEPT VISIT MOD MDM: CPT

## 2024-05-03 PROCEDURE — 99283 EMERGENCY DEPT VISIT LOW MDM: CPT

## 2024-05-03 RX ORDER — CYCLOBENZAPRINE HCL 10 MG
10 TABLET ORAL 3 TIMES DAILY PRN
Qty: 20 TABLET | Refills: 0 | Status: SHIPPED | OUTPATIENT
Start: 2024-05-03 | End: 2024-05-10

## 2024-05-03 RX ORDER — METHYLPREDNISOLONE 4 MG/1
TABLET ORAL
Qty: 1 EACH | Refills: 0 | Status: SHIPPED | OUTPATIENT
Start: 2024-05-03

## 2024-05-03 NOTE — ED INITIAL ASSESSMENT (HPI)
Pt c/o of pain to her both legs. Pt states she is also having pain on her calf. Denies SOB. Denies recent travels. Pt states she had blood clots 3 years ago. Pt on xarelto.

## 2024-05-03 NOTE — ED PROVIDER NOTES
Patient Seen in: [facility] Emergency Department      History     Chief Complaint   Patient presents with    Pain     Stated Complaint: leg pain    Subjective:   HPI    68-year-old female with history of prior deep vein thrombosis presently on Xarelto presents with complaints of left lower extremity pain.  The patient reports pain beginning 4 to 5 days ago worsened at night.  She states the pain initially was to her low back on the left side radiating towards her left hip and thigh.  She then reports the pain migrated down to her leg down towards her ankle.  She states the pain has been waxing and waning over the past 5 days.  She states the pain feels similar to when she had prior DVT.  She denies any recent injuries.  She denies focal weakness or numbness.  She denies incontinence of bowel or bladder.  No fevers.    Objective:   No pertinent past medical history.            No pertinent past surgical history.              No pertinent social history.            Review of Systems    Positive for stated complaint: leg pain  Other systems are as noted in HPI.  Constitutional and vital signs reviewed.      All other systems reviewed and negative except as noted above.    Physical Exam     ED Triage Vitals [05/03/24 1151]   /80   Pulse 61   Resp 18   Temp 98.1 °F (36.7 °C)   Temp src Temporal   SpO2 98 %   O2 Device None (Room air)       Current:/80   Pulse 61   Temp 98.1 °F (36.7 °C) (Temporal)   Resp 18   SpO2 98%         Physical Exam      General Appearance: alert, no distress  Eyes: pupils equal and round no pallor or injection  ENT, Mouth: mucous membranes moist  Respiratory: there are no retractions, lungs are clear to auscultation  Cardiovascular: regular rate and rhythm  Gastrointestinal:  abdomen is soft and non tender, no masses, bowel sounds normal  Neurological: Speech normal.  Motor and sensation is intact and symmetric to bilateral lower extremities.  Skin: warm and dry, no  rashes.  Musculoskeletal: neck is supple non tender        Extremities are symmetrical, full range of motion  Back: No pain to palpation to the midline or bilateral paraspinal region of the lumbar spinal area.  Pain with straight leg raise bilaterally at 20 degrees.  Psychiatric: patient is oriented X 3, there is no agitation    DIFFERENTIAL DIAGNOSIS: After history and physical exam differential diagnosis was considered for deep vein thrombosis, sciatica, or other        ED Course   Labs Reviewed - No data to display                 MDM      Ultrasound negative for DVT.  Suspect possible sciatica.  Will give a course of steroids along with muscle relaxants.  She is advised to follow-up with her primary physician for reevaluation and further treatment.  She is advised to return if new or worsening symptoms develop.                                   Medical Decision Making      Disposition and Plan     Clinical Impression:  1. Sciatica of left side         Disposition:  Discharge  5/3/2024  2:42 pm    Follow-up:  Vaishali Martines MD  [address] [place] [id number]  [phone]    Follow up      We recommend that you schedule follow up care with a primary care provider within the next three months to obtain basic health screening including reassessment of your blood pressure.      Medications Prescribed:  Current Discharge Medication List        START taking these medications    Details   methylPREDNISolone (MEDROL) 4 MG Oral Tablet Therapy Pack Dosepack: take as directed  Qty: 1 each, Refills: 0      cyclobenzaprine 10 MG Oral Tab Take 1 tablet (10 mg total) by mouth 3 (three) times daily as needed for Muscle spasms.  Qty: 20 tablet, Refills: 0

## 2024-05-03 NOTE — DISCHARGE INSTRUCTIONS
Take steroids as prescribed.  Take muscle relaxant as prescribed, as needed.  You may also take Tylenol as needed for pain.  Follow-up with your primary physician for further evaluation and treatment.  Return to the emergency department if increased pain, focal weakness, or other new symptoms develop.

## 2024-05-03 NOTE — TELEPHONE ENCOUNTER
Action Requested: Summary for Provider     []  Critical Lab, Recommendations Needed  [] Need Additional Advice  []   FYI    []   Need Orders  [] Need Medications Sent to Pharmacy  []  Other     SUMMARY: Patient calling with complaints of left lower leg pain. Pain starts in ankle area and goes up back of calve. Patient has hx of clots in the right leg 2 years ago. Sees Hematology and is on blood thinner rivaroxaban 10 MG Oral Tab.     No swelling, but patient reports that calve feels warm on the inside. No fever, no redness or swelling noted. Patient reports pain feels similar to when she had the clot in right leg.     Advised ER for eval today to rule out clot and agrees to go to [place] now for evaluation.     Reason for call: Leg Pain  Onset: Monday of this week       Reason for Disposition   Thigh or calf pain in only one leg and present > 1 hour    Protocols used: Leg Pain-A-[place]

## 2024-06-05 LAB — CYTOLOGY CVX/VAG DOC THIN PREP: NORMAL

## 2024-06-09 ENCOUNTER — TELEPHONE (OUTPATIENT)
Dept: OBGYN | Age: 69
End: 2024-06-09

## 2024-07-26 ENCOUNTER — HOSPITAL ENCOUNTER (OUTPATIENT)
Dept: ULTRASOUND IMAGING | Age: 69
End: 2024-07-26
Attending: OBSTETRICS & GYNECOLOGY

## 2024-07-26 DIAGNOSIS — Z87.42 HX OF OVARIAN CYST: ICD-10-CM

## 2024-07-26 PROCEDURE — 76830 TRANSVAGINAL US NON-OB: CPT

## 2024-10-22 ENCOUNTER — TELEPHONE (OUTPATIENT)
Dept: GASTROENTEROLOGY | Facility: CLINIC | Age: 69
End: 2024-10-22

## 2024-10-22 DIAGNOSIS — Z86.0100 HISTORY OF COLONIC POLYPS: Primary | ICD-10-CM

## 2024-10-22 NOTE — TELEPHONE ENCOUNTER
COLONOSCOPY PROCEDURE REPORT     DATE OF PROCEDURE:  12/24/2019      PCP: Vaishali Martines MD     PREOPERATIVE DIAGNOSIS:  Screening colonoscopy examination     POSTOPERATIVE DIAGNOSIS:  See impression.     SURGEON:  Edd Rod M.D.     SEDATION:    MAC anesthesia provided by the Anesthesia Service.  MAC anesthesia requested due to poor tolerance of previous exam under conscious sedation; anticipated intolerance of colonoscopy examination under safe doses conscious sedation medications     COLONOSCOPY PROCEDURE:   After the nature and risks of colonoscopy examination under MAC anesthesia were discussed with the patient and all questions answered, informed consent was obtained.  The patient was sedated as above.       Digital rectal exam was performed which showed no masses.  The Olympus pediatric video colonoscope was placed in the patient's rectum and advanced under direct visualization through the entire length of the colon up to the cecum and terminal ileum.  Retroflex exam performed up the ascending colon.  The cecum was confirmed by landmarks including appendiceal orifice, cecal trifold, ileocecal valve.  Retroflexion was performed in the rectum.     The quality of the prep was excellent.     COLONOSCOPY FINDINGS:    Diminutive 3 mm polyp removed from the cecum by excisional biopsy with biopsy forceps  Occasional diverticuli down the descending and sigmoid colon.  Small internal hemorrhoids.  Unusually redundant sigmoid and transverse colon, challenging exam today.     RECOMMENDATIONS:  High fiber diet.  Follow-up above colon polyp pathology result.  Repeat colonoscopy examination in 5 years.  No aspirin or NSAID medications for next 3 days to prevent bleeding               Electronically signed by Edd Rod MD at 12/24/2019  8:25 AM  Final Diagnosis:      Cecum polyp x1; biopsy:  Fragments of tubular adenoma

## 2024-10-22 NOTE — TELEPHONE ENCOUNTER
Patient outreach message received:    Letter mailed to pt and recall for repeat Colon in 5 yrs,12/24/24 per      Recall reminder letter mailed out to patient.

## 2024-10-22 NOTE — TELEPHONE ENCOUNTER
Colon recall.  Medications, pharmacy, and allergies reviewed.   Please advise on colonoscopy and bowel prep orders.       › H/W/BMI: 5'7\"/175lbs/27.4    Special comments/notes:  Recall    Last Procedure, Date, MD:   Colonoscopy, 12/24/19, CB   Last diagnosis: Tubular adenoma    Recalled for (mth/yrs): 5 years   Sedation used previously: MAC   Last Prep Used: Golytely    Quality of Prep: excellent     Telephone Colon Screening Questionnaire Yes No   Are you currently experiencing any new/abnormal GI symptoms? [] [x]   Rectal bleeding? [] [x]   Black stool? [] [x]   Dysphagia or food \"feeling stuck\" when eating? [] [x]   Intractable vomiting? [] [x]   Unexplained weight loss? [] [x]   First colonoscopy? [] [x]   Family history of colon cancer? [] [x]   Any issues with anesthesia? [] [x]   If yes, explain:      Any recent complaints of chest pain or shortness of breath?  [] [x]   Referred to a cardiologist?  [] [x]   If yes, explain:      Stroke, MI (heart attack) or stent placement in the last 12 months:  [] [x]   History of  respiratory issues/oxygen/JOSE RAMON/COPD: [] [x]   If yes, CPAP/BiPAP:     History of devices (pacemaker/defibrillator) [] [x]   History of cardiac/CVA/(MI/stroke): [] [x]     Medications Yes  No   Anticoagulants (except Aspirin): Sis Martines [x] []   Diabetic Meds [] [x]   Weight loss meds (phentermine/vyvanse/saxsenda/etc): [] [x]   Iron/herbal/multivitamin supplement: [] [x]   Usage of marijuana, CBD &/or vape products: [] [x]

## 2024-10-25 ENCOUNTER — HOSPITAL ENCOUNTER (OUTPATIENT)
Age: 69
Discharge: HOME | End: 2024-10-25
Attending: EMERGENCY MEDICINE
Payer: MEDICARE

## 2024-10-25 VITALS
RESPIRATION RATE: 20 BRPM | HEART RATE: 77 BPM | SYSTOLIC BLOOD PRESSURE: 104 MMHG | TEMPERATURE: 98 F | DIASTOLIC BLOOD PRESSURE: 83 MMHG | OXYGEN SATURATION: 98 %

## 2024-10-25 DIAGNOSIS — N30.01 ACUTE CYSTITIS WITH HEMATURIA: Primary | ICD-10-CM

## 2024-10-25 LAB
BILIRUB UR QL STRIP: NEGATIVE
COLOR UR: YELLOW
GLUCOSE UR STRIP-MCNC: NEGATIVE MG/DL
KETONES UR STRIP-MCNC: NEGATIVE MG/DL
NITRITE UR QL STRIP: NEGATIVE
PH UR STRIP: 7 [PH]
PROT UR STRIP-MCNC: 100 MG/DL
SP GR UR STRIP: 1.02
UROBILINOGEN UR STRIP-ACNC: <2 MG/DL

## 2024-10-25 PROCEDURE — 87088 URINE BACTERIA CULTURE: CPT | Performed by: EMERGENCY MEDICINE

## 2024-10-25 PROCEDURE — 99214 OFFICE O/P EST MOD 30 MIN: CPT

## 2024-10-25 PROCEDURE — 87186 SC STD MICRODIL/AGAR DIL: CPT | Performed by: EMERGENCY MEDICINE

## 2024-10-25 PROCEDURE — 81002 URINALYSIS NONAUTO W/O SCOPE: CPT

## 2024-10-25 PROCEDURE — 87086 URINE CULTURE/COLONY COUNT: CPT | Performed by: EMERGENCY MEDICINE

## 2024-10-25 RX ORDER — SULFAMETHOXAZOLE AND TRIMETHOPRIM 800; 160 MG/1; MG/1
1 TABLET ORAL 2 TIMES DAILY
Qty: 14 TABLET | Refills: 0 | Status: SHIPPED | OUTPATIENT
Start: 2024-10-25 | End: 2024-11-01

## 2024-10-25 NOTE — ED PROVIDER NOTES
Patient Seen in: [facility]      History     Chief Complaint   Patient presents with    Urinary Symptoms     Stated Complaint: Uti    Subjective:   HPI      The patient is a 69-year-old female with past history of hyperlipidemia who presents now with urinary symptoms.  The patient states she has had urinary frequency and suprapubic pressure for approximately 3 days.  The patient denies any fever.  Patient has chronic back pain but no acute back pain.  Patient does have some mild suprapubic pressure intermittently.    Objective:     Past Medical History:    Colon polyp    Constipation    Deep vein thrombosis (HCC)    GERD (gastroesophageal reflux disease)    Hemorrhoids    Hiatal hernia    High cholesterol    Renal disorder    Visual impairment    reading glassess              Past Surgical History:   Procedure Laterality Date    Colonoscopy      [facility]    Colonoscopy N/A 2019    Procedure: COLONOSCOPY;  Surgeon: Edd Rod MD;  Location: [facility] ENDOSCOPY    Egd      [facility]    Hysterectomy      fibroid      1992    Other surgical history  1972    one ovary removed/ cyst                Social History     Socioeconomic History    Marital status:    Occupational History    Occupation: Retired    Tobacco Use    Smoking status: Never    Smokeless tobacco: Never   Vaping Use    Vaping status: Never Used   Substance and Sexual Activity    Alcohol use: No    Drug use: No   Other Topics Concern    Pt has a pacemaker No    Pt has a defibrillator No    Reaction to local anesthetic No     Social Drivers of Health     Financial Resource Strain: Low Risk  (10/21/2024)    Received from [facility]    Overall Financial Resource Strain (CARDIA)     Difficulty of Paying Living Expenses: Not hard at all   Food Insecurity: No Food Insecurity (10/21/2024)    Received from [facility]    Hunger Vital Sign      Worried About Running Out of Food in the Last Year: Never true     Ran Out of Food in the Last Year: Never true   Transportation Needs: No Transportation Needs (10/21/2024)    Received from [facility]    PRAPARE - Transportation     Lack of Transportation (Medical): No     Lack of Transportation (Non-Medical): No   Housing Stability: Unknown (10/21/2024)    Received from [facility]    Housing Stability Vital Sign     Unable to Pay for Housing in the Last Year: No              Review of Systems    Positive for stated complaint: Uti  Other systems are as noted in HPI.  Constitutional and vital signs reviewed.      All other systems reviewed and negative except as noted above.    Physical Exam     ED Triage Vitals [10/25/24 1019]   /83   Pulse 77   Resp 20   Temp 97.6 °F (36.4 °C)   Temp src Temporal   SpO2 98 %   O2 Device None (Room air)       Current Vitals:   Vital Signs  BP: 104/83  Pulse: 77  Resp: 20  Temp: 97.6 °F (36.4 °C)  Temp src: Temporal    Oxygen Therapy  SpO2: 98 %  O2 Device: None (Room air)        Physical Exam    Constitutional: Well-developed well-nourished in no acute distress  Head: Normocephalic, no swelling or tenderness  Eyes: Nonicteric sclera, no conjunctival injection  ENT: TMs are clear and flat bilaterally.  There is no posterior pharyngeal erythema  Chest: Clear to auscultation, no tenderness  Cardiovascular: Regular rate and rhythm without murmur  Abdomen: Soft, nontender and nondistended  Neurologic: Patient is awake, alert and oriented ×3.  The patient's motor strength is 5 out of 5 and symmetric in the upper and lower extremities bilaterally  Extremities: No focal swelling or tenderness  Skin: No pallor, no redness or warmth to the touch      ED Course     Labs Reviewed   EM POCT URINALYSIS DIPSTICK - Abnormal; Notable for the following components:       Result Value    Urine Clarity Cloudy (*)     Protein urine 100 (*)     Blood,  Urine Large (*)     Leukocyte esterase urine Large (*)     All other components within normal limits   URINE CULTURE, ROUTINE        Patient had electrolytes done on 10/27/2023 where her GFR was 71.    Patient's urinalysis was discussed with her.  Will send for urine culture and initiate Bactrim.       MDM      Cystitis versus pyelonephritis        Medical Decision Making      Disposition and Plan     Clinical Impression:  1. Acute cystitis with hematuria         Disposition:  Discharge  10/25/2024 10:33 am    Follow-up:  Vaishali Martines MD  [address] [place] [id number] [phone]    In 3 days  If symptoms worsen          Medications Prescribed:  Current Discharge Medication List        START taking these medications    Details   sulfamethoxazole-trimethoprim -160 MG Oral Tab per tablet Take 1 tablet by mouth 2 (two) times daily for 7 days.  Qty: 14 tablet, Refills: 0                 Supplementary Documentation:

## 2024-10-25 NOTE — ED INITIAL ASSESSMENT (HPI)
Patient arrives ambulatory with c/o urinary frequency pain while finishing urination, suprapubic pressure x few days.

## 2024-10-29 RX ORDER — SODIUM, POTASSIUM,MAG SULFATES 17.5-3.13G
SOLUTION, RECONSTITUTED, ORAL ORAL
Qty: 1 EACH | Refills: 0 | Status: SHIPPED | OUTPATIENT
Start: 2024-10-29

## 2024-10-29 NOTE — TELEPHONE ENCOUNTER
Thanks all.    Annual OV with PCP Vaishali Martines MD 11/27/2023.  History of DVT on Xarelto anticoagulation.    My previous colonoscopy procedure report 12/24/2019 reviewed.  Diminutive adenomatous polyp removed.    GI schedulers -    Please schedule colonoscopy exam at [facility]    BMI Readings from Last 1 Encounters:   02/05/24 27.41 kg/m²     MAC anesthesia     BP Readings from Last 5 Encounters:   10/25/24 104/83   05/03/24 118/84   12/19/23 110/77   12/01/23 117/69   11/27/23 110/70       Suprep small volume bowel prep if covered by insurance, otherwise   Golytely (PEG) 4L bowel prep  Rx sent in to [facility]      DX = history adenomatous colon polyp    Medication instructions:    Standard orders for blood thinners:    Hold Xarelto/rivaroxaban  3 days prior to procedure

## 2024-10-30 NOTE — TELEPHONE ENCOUNTER
Scheduled for:  Colonoscopy [id number]  Provider Name:     Date:  Thursday, 03/06/2025  Location:  [facility]  Sedation:  Mac  Time:  10AM (pt is aware [facility] will call with arrival time     Prep:  Suprep  Meds/Allergies Reconciled?:  Yes    Diagnosis with codes:   history adenomatous colon polyp Z86.010  Was patient informed to call insurance with codes (Y/N):  Yes     Referral sent?:  Referral was sent at the time of electronic surgical scheduling.    [facility] or [facility] notified?:  I sent an electronic request to Endo Scheduling and received a confirmation today.       Medication Orders:  Standard orders for blood thinners:     Hold Xarelto/rivaroxaban  3 days prior to procedure  Misc Orders:  None     Further instructions given by staff:  I discussed the prep instructions with the patient which she verbally understood and is aware that I will send the instructions today.via Spout

## 2024-11-25 ENCOUNTER — APPOINTMENT (OUTPATIENT)
Dept: HEMATOLOGY/ONCOLOGY | Facility: HOSPITAL | Age: 69
End: 2024-11-25
Attending: INTERNAL MEDICINE
Payer: OTHER GOVERNMENT

## 2024-11-25 ENCOUNTER — APPOINTMENT (OUTPATIENT)
Dept: HEMATOLOGY/ONCOLOGY | Facility: HOSPITAL | Age: 69
End: 2024-11-25
Attending: FAMILY MEDICINE
Payer: MEDICARE

## 2024-12-09 ENCOUNTER — OFFICE VISIT (OUTPATIENT)
Dept: HEMATOLOGY/ONCOLOGY | Facility: HOSPITAL | Age: 69
End: 2024-12-09
Attending: INTERNAL MEDICINE
Payer: MEDICARE

## 2024-12-09 VITALS
DIASTOLIC BLOOD PRESSURE: 76 MMHG | SYSTOLIC BLOOD PRESSURE: 117 MMHG | TEMPERATURE: 98 F | RESPIRATION RATE: 16 BRPM | OXYGEN SATURATION: 97 % | BODY MASS INDEX: 27.91 KG/M2 | WEIGHT: 177.81 LBS | HEIGHT: 67 IN | HEART RATE: 72 BPM

## 2024-12-09 DIAGNOSIS — Z79.01 ANTICOAGULATION MANAGEMENT ENCOUNTER: ICD-10-CM

## 2024-12-09 DIAGNOSIS — Z51.81 ANTICOAGULATION MANAGEMENT ENCOUNTER: ICD-10-CM

## 2024-12-09 DIAGNOSIS — Z86.718 HISTORY OF RECURRENT DEEP VEIN THROMBOSIS: Primary | ICD-10-CM

## 2024-12-09 PROCEDURE — 99211 OFF/OP EST MAY X REQ PHY/QHP: CPT

## 2024-12-09 NOTE — PROGRESS NOTES
[facility] Progress Note     Diagnosis  1. History of recurrent deep vein thrombosis    2. Anticoagulation management encounter      Current Therapy  Xarelto 10mg daily    Interval History  Patient returns for follow-up. She was previously under the care of Dr. Von Duque. Since the last visit She has done well. Continues on Xarelto. Patient denies any nausea, vomiting, fevers, chills or pain. Pt also denies any bleeding, specifically hematuria, hematemesis or hemoptysis.  She does have some atypical chest pain and cardiac workup has been negative.  EGD 2 years ago did show a hiatal hernia.  I suspect she is having some esophageal spasms    HPI:  68-year-old female with recurrent lower extremity DVT.  First episode 2018.  This was associated with travel to [place].  She has had subsequent episodes off anticoagulation also associated with travel she is seen hematology at [facility] with Dr. Dennis as well as Johanny    She was previously on Pradaxa resolution of symptoms more recently has been on 20 mg of Xarelto, was reduced to 10mg.    Past Medical History:    Colon polyp    Constipation    Deep vein thrombosis (HCC)    GERD (gastroesophageal reflux disease)    Hemorrhoids    Hiatal hernia    High cholesterol    Renal disorder    Visual impairment    reading glassess     Current Outpatient Medications on File Prior to Visit   Medication Sig Dispense Refill    Na Sulfate-K Sulfate-Mg Sulf (SUPREP BOWEL PREP KIT) 17.5-3.13-1.6 GM/177ML Oral Solution Take as directed 1 each 0    methylPREDNISolone (MEDROL) 4 MG Oral Tablet Therapy Pack Dosepack: take as directed 1 each 0    alendronate (FOSAMAX) 70 MG Oral Tab Take 1 tablet (70 mg total) by mouth once a week. 12 tablet 3    rivaroxaban 10 MG Oral Tab Take 1 tablet (10 mg total) by mouth daily with food. 90 tablet 3    atorvastatin 10 MG Oral Tab Take 1 tablet (10 mg total) by mouth nightly.       No current facility-administered medications on file prior to  visit.     PHYSICAL EXAMINATION  /76 (BP Location: Left arm, Patient Position: Sitting, Cuff Size: adult)   Pulse 72   Temp 98.2 °F (36.8 °C) (Oral)   Resp 16   Ht 1.702 m (5' 7\")   Wt 80.6 kg (177 lb 12.8 oz)   SpO2 97%   BMI 27.85 kg/m²    Constitutional Well developed and well nourished; in no apparent distress; appears close to chronological age.  Head Normocephalic and atraumatic.  Eyes Conjunctiva clear; sclera anicteric.  ENMT External nose normal; external ears normal.  Respiratory Normal effort; no respiratory distress.  Cardiovascular Regular rate and rhythm.  Extremities No clubbing, cyanosis, or edema.  Neurologic Alert and oriented x 3; motor and sensory grossly intact.  Psychiatric Mood and affect appropriate; coherent speech; verbalizes understanding of our discussions today.    Data reviewed include hematology notes from [facility] labs ultrasound from [place] labs from [facility] D-dimer levels    A/P  69 year old With recurrent lower extremity DVT as outlined above, now on indefinite AC  -- Continue Xarelto 10mg daily. Discussed that this dose has been shown to reduce risk of recurrent DVT with a lower risk fo bleeding compared to 20mg  -- CBC/D-dimer at her request with next draw.  I explained to her that D-dimer is a nonspecific test in patients with prior DVT history  -- Atypical chest pain: ?  Reflux esophagitis versus esophageal spasms.  If this is persistent recommend GI follow-up.    RTC 1 year.

## 2024-12-16 ENCOUNTER — OFFICE VISIT (OUTPATIENT)
Dept: FAMILY MEDICINE CLINIC | Facility: CLINIC | Age: 69
End: 2024-12-16
Payer: MEDICARE

## 2024-12-16 VITALS
TEMPERATURE: 97 F | HEART RATE: 75 BPM | DIASTOLIC BLOOD PRESSURE: 74 MMHG | SYSTOLIC BLOOD PRESSURE: 110 MMHG | BODY MASS INDEX: 27.94 KG/M2 | WEIGHT: 178 LBS | HEIGHT: 67 IN

## 2024-12-16 DIAGNOSIS — E78.00 HYPERCHOLESTEREMIA: ICD-10-CM

## 2024-12-16 DIAGNOSIS — Z90.710 HISTORY OF HYSTERECTOMY FOR BENIGN DISEASE: ICD-10-CM

## 2024-12-16 DIAGNOSIS — Z79.01 ANTICOAGULATED: ICD-10-CM

## 2024-12-16 DIAGNOSIS — M79.605 LEFT LEG PAIN: ICD-10-CM

## 2024-12-16 DIAGNOSIS — Z00.00 ENCOUNTER FOR ANNUAL HEALTH EXAMINATION: Primary | ICD-10-CM

## 2024-12-16 DIAGNOSIS — K21.9 GASTROESOPHAGEAL REFLUX DISEASE, UNSPECIFIED WHETHER ESOPHAGITIS PRESENT: ICD-10-CM

## 2024-12-16 DIAGNOSIS — Z86.718 HISTORY OF RECURRENT DEEP VEIN THROMBOSIS: ICD-10-CM

## 2024-12-16 DIAGNOSIS — M85.80 OSTEOPENIA, UNSPECIFIED LOCATION: ICD-10-CM

## 2024-12-16 DIAGNOSIS — Z12.31 ENCOUNTER FOR SCREENING MAMMOGRAM FOR BREAST CANCER: ICD-10-CM

## 2024-12-16 DIAGNOSIS — M81.0 OSTEOPOROSIS, UNSPECIFIED OSTEOPOROSIS TYPE, UNSPECIFIED PATHOLOGICAL FRACTURE PRESENCE: ICD-10-CM

## 2024-12-16 PROBLEM — Z80.0 FH: COLON CANCER: Status: RESOLVED | Noted: 2017-08-09 | Resolved: 2024-12-16

## 2024-12-16 PROBLEM — R76.8 POSITIVE ANA (ANTINUCLEAR ANTIBODY): Status: RESOLVED | Noted: 2022-08-10 | Resolved: 2024-12-16

## 2024-12-16 PROBLEM — Z80.3 FH: BREAST CANCER: Status: RESOLVED | Noted: 2017-08-09 | Resolved: 2024-12-16

## 2024-12-16 PROBLEM — N83.202 LEFT OVARIAN CYST: Status: RESOLVED | Noted: 2022-09-11 | Resolved: 2024-12-16

## 2024-12-16 RX ORDER — ALENDRONATE SODIUM 70 MG/1
70 TABLET ORAL WEEKLY
Qty: 12 TABLET | Refills: 3 | Status: SHIPPED | OUTPATIENT
Start: 2024-12-16

## 2024-12-16 NOTE — PROGRESS NOTES
Subjective:   Manda Patel is a 69 year old female who presents for a Medicare Wellness Visit charge within the last 11 months and Patient may not meet criteria for AWV: Please evaluate for correct coding and scheduled follow up of multiple significant but stable problems.     Wt Readings from Last 6 Encounters:   12/16/24 178 lb (80.7 kg)   12/09/24 177 lb 12.8 oz (80.6 kg)   02/05/24 175 lb (79.4 kg)   12/01/23 175 lb (79.4 kg)   11/27/23 176 lb (79.8 kg)   10/25/23 175 lb (79.4 kg)     BP Readings from Last 3 Encounters:   12/16/24 110/74   12/09/24 117/76   10/25/24 104/83         History of hip pain on her left side and feels start from left sacroiliac buttock.  Feels worse with cold weather   Has left foot pain and has left foot xray at [facility].    Feels hip alignment is off. And left leg may be shorter   Has pain more so left thigh     Has osteoporosis  Has been on fosamax  7/2021, was off medication for a year.    Saw gynecology in summer 2024, elsewhere.    Colonoscopy scheduled for march 2025.      History/Other:   Fall Risk Assessment:   She has been screened for Falls and is low risk.      Cognitive Assessment:   She had a completely normal cognitive assessment - see flowsheet entries     Functional Ability/Status:   Manda Patel has a completely normal functional assessment. See flowsheet for details.        Depression Screening (PHQ):  PHQ-2 SCORE: 0  , done 12/9/2024            Advanced Directives:   She does NOT have a Living Will. [Do you have a living will?: (Patient-Rptd) No]  She does NOT have a Power of  for Health Care. [Do you have a healthcare power of ?: (Patient-Rptd) No]  Discussed Advance Care Planning with patient (and family/surrogate if present). Standard forms made available to patient in After Visit Summary.      Patient Active Problem List   Diagnosis    History of hysterectomy for benign disease    History of recurrent deep vein thrombosis    Anticoagulated     Hypercholesteremia    Osteoporosis    Left leg pain     Allergies:  She is allergic to penicillins, streptomycin, and other.    Current Medications:  Outpatient Medications Marked as Taking for the 24 encounter (Office Visit) with Vaishali Martines MD   Medication Sig    rivaroxaban 10 MG Oral Tab Take 1 tablet (10 mg total) by mouth daily with food.    Na Sulfate-K Sulfate-Mg Sulf (SUPREP BOWEL PREP KIT) 17.5-3.13-1.6 GM/177ML Oral Solution Take as directed    alendronate (FOSAMAX) 70 MG Oral Tab Take 1 tablet (70 mg total) by mouth once a week.    atorvastatin 10 MG Oral Tab Take 1 tablet (10 mg total) by mouth nightly.       Medical History:  She  has a past medical history of Colon polyp (), Constipation, Deep vein thrombosis (HCC), GERD (gastroesophageal reflux disease), Hemorrhoids, Hiatal hernia, High cholesterol, Renal disorder, and Visual impairment.  Surgical History:  She  has a past surgical history that includes other surgical history (); hysterectomy;  (); colonoscopy (); egd (); and colonoscopy (N/A, 2019).   Family History:  Her family history includes Breast Cancer (age of onset: 65) in her mother; Cancer in her maternal aunt and maternal aunt; Cancer (age of onset: 69) in her mother; Cancer (age of onset: 83) in her father; Other (age of onset: 68) in her brother.  Social History:  She  reports that she has never smoked. She has never used smokeless tobacco. She reports that she does not drink alcohol and does not use drugs.    Tobacco:  She has never smoked tobacco.    CAGE Alcohol Screen:   CAGE screening score of 0 on 2024, showing low risk of alcohol abuse.      Patient Care Team:  Vaishali Martines MD as PCP - General (Family Medicine)  Dimitris Whitlock MD (OBSTETRICS & GYNECOLOGY)    Review of Systems     Negative except above left leg pain    Objective:   Physical Exam  General Appearance:  Alert, cooperative, no distress, appears stated age   Head:   Normocephalic, without obvious abnormality, atraumatic   Eyes:  PERRL, conjunctiva/corneas clear, EOM's intact both eyes   Ears:  Normal TM's and external ear canals, both ears   Nose: Nares normal, septum midline,mucosa normal, no drainage or sinus tenderness   Throat: Lips, mucosa, and tongue normal; teeth and gums normal   Neck: Supple, symmetrical, trachea midline, no adenopathy;  thyroid: not enlarged, symmetric, no tenderness/mass/nodules; no carotid bruit or JVD   Back:   Symmetric, no curvature, ROM normal, no CVA tenderness   Lungs:   Clear to auscultation bilaterally, respirations unlabored   Heart:  Regular rate and rhythm, S1 and S2 normal, no murmur, rub, or gallop   Abdomen:   Soft, non-tender, bowel sounds active all four quadrants,  no masses, no organomegaly   Pelvic: Deferred   Extremities: Extremities normal, atraumatic, no cyanosis or edema   Pulses: 2+ and symmetric   Skin: Skin color, texture, turgor normal, no rashes or lesions   Lymph nodes: Cervical, supraclavicular, and axillary nodes normal   Neurologic: Normal       /74   Pulse 75   Temp 96.9 °F (36.1 °C) (Temporal)   Ht 5' 7\" (1.702 m)   Wt 178 lb (80.7 kg)   BMI 27.88 kg/m²  Estimated body mass index is 27.88 kg/m² as calculated from the following:    Height as of this encounter: 5' 7\" (1.702 m).    Weight as of this encounter: 178 lb (80.7 kg).    Medicare Hearing Assessment:   Hearing Screening    Screening Method: Finger Rub  Finger Rub Result: Pass               Assessment & Plan:   Manda Patel is a 69 year old female who presents for a Medicare Assessment.     1. Encounter for annual health examination (Primary)  2. Hypercholesteremia  -     Detailed, Mod Complex (91175)  -     Comp Metabolic Panel (14); Future; Expected date: 12/16/2024  -     Lipid Panel; Future; Expected date: 12/16/2024  3. Osteoporosis, unspecified osteoporosis type, unspecified pathological fracture presence  -     Detailed, Mod Complex  (78865)  -     Vitamin D; Future; Expected date: 12/16/2024  4. History of recurrent deep vein thrombosis  5. History of hysterectomy for benign disease  6. Gastroesophageal reflux disease, unspecified whether esophagitis present  7. Encounter for screening mammogram for breast cancer  -     [facility] ALLEY 2D+3D SCREENING BILAT (CPT=77067/87316); Future; Expected date: 03/16/2025  8. Left leg pain  -     Detailed, Mod Complex (93753)  -     ORTHOPEDIC - INTERNAL  -     XR HIP + PELVIS MIN 4 VIEWS LEFT (CPT=73503); Future; Expected date: 12/16/2024  -     XR LUMBAR SPINE (MIN 2 VIEWS) (CPT=72100); Future; Expected date: 12/16/2024  -     XR FEMUR MIN 2 VIEWS LEFT (CPT=73552); Future; Expected date: 12/16/2024  9. Anticoagulated  Other orders  -     Cancel: High Dose Fluzone trivalent influenza, 65yrs+ PFS (11367)  1. Encounter for annual health examination      2. Hypercholesteremia  Stable condition  Reviewed medications  Continue current medication management   All questions answered to the best of my ability.    - Detailed, Mod Complex (05675)  - Comp Metabolic Panel (14); Future  - Lipid Panel; Future    3. Osteoporosis, unspecified osteoporosis type, unspecified pathological fracture presence  On fosamax since 7/2021, on and off  Recent dexa reviewed , due 2/2026  - Detailed, Mod Complex (07185)  - Vitamin D [E]; Future    4. History of recurrent deep vein thrombosis  Saw hematology   Notes reviewed  Continue present management     5. History of hysterectomy for benign disease      6. Gastroesophageal reflux disease, unspecified whether esophagitis present  Stable  Continue present management     7. Encounter for screening mammogram for breast cancer  Due march 2025  - [facility] ALLEY 2D+3D SCREENING BILAT (CPT=77067/92788); Future    8. Left leg pain  ? Sciatica ? Leg length discrepancy  - Detailed, Mod Complex (27834)  - ORTHOPEDIC - INTERNAL  - XR HIP + PELVIS MIN 4 VIEWS LEFT (CPT=73503); Future  - XR LUMBAR SPINE (MIN 2  VIEWS) (CPT=72100); Future  - XR FEMUR MIN 2 VIEWS LEFT (CPT=73552); Future    9. Anticoagulated  Continue present management   Reviewed fall precautions   Avoid nsaids         ICD-10-CM    1. Encounter for annual health examination  Z00.00       2. Hypercholesteremia  E78.00 Detailed, Mod Complex (06036)     Comp Metabolic Panel (14)     Lipid Panel      3. Osteoporosis, unspecified osteoporosis type, unspecified pathological fracture presence  M81.0 Detailed, Mod Complex (79012)     Vitamin D [E]      4. History of recurrent deep vein thrombosis  Z86.718       5. History of hysterectomy for benign disease  Z90.710       6. Gastroesophageal reflux disease, unspecified whether esophagitis present  K21.9       7. Encounter for screening mammogram for breast cancer  Z12.31 [facility] ALLEY 2D+3D SCREENING BILAT (CPT=77067/86370)      8. Left leg pain  M79.605 Detailed, Mod Complex (15589)     ORTHOPEDIC - INTERNAL     XR HIP + PELVIS MIN 4 VIEWS LEFT (CPT=73503)     XR LUMBAR SPINE (MIN 2 VIEWS) (CPT=72100)     XR FEMUR MIN 2 VIEWS LEFT (CPT=73552)      9. Anticoagulated  Z79.01         The patient indicates understanding of these issues and agrees to the plan.  Consult ordered.  Continue with current treatment plan.  Imaging studies ordered.  Lab work ordered.  Reinforced healthy diet, lifestyle, and exercise.      No follow-ups on file.     Vaishali Martines MD, 12/16/2024     Supplementary Documentation:   General Health:  In the past six months, have you lost more than 10 pounds without trying?: (Patient-Rptd) 2 - No  Has your appetite been poor?: (Patient-Rptd) No  Type of Diet: (Patient-Rptd) Other  How does the patient maintain a good energy level?: (Patient-Rptd) Daily Walks;Stretching  How would you describe your daily physical activity?: (Patient-Rptd) Light  How would you describe your current health state?: (Patient-Rptd) Good  How do you maintain positive mental well-being?: (Patient-Rptd) Social  Interaction;Puzzles;Visiting Friends  On a scale of 0 to 10, with 0 being no pain and 10 being severe pain, what is your pain level?: (Patient-Rptd) 9 - (Severe)  In the past six months, have you experienced urine leakage?: (Patient-Rptd) 0-No  At any time do you feel concerned for the safety/well-being of yourself and/or your children, in your home or elsewhere?: (Patient-Rptd) No  Have you had any immunizations at another office such as Influenza, Hepatitis B, Tetanus, or Pneumococcal?: (Patient-Rptd) No    Health Maintenance   Topic Date Due    COVID-19 Vaccine (1 - 2024-25 season) Never done    Influenza Vaccine (1) Never done    Annual Physical  11/27/2024    Colorectal Cancer Screening  12/24/2024    Mammogram  03/14/2025    DEXA Scan  Completed    Annual Depression Screening  Completed    Fall Risk Screening (Annual)  Completed    Pneumococcal Vaccine: 65+ Years  Completed    Zoster Vaccines  Completed

## 2024-12-17 ENCOUNTER — LAB ENCOUNTER (OUTPATIENT)
Dept: LAB | Age: 69
End: 2024-12-17
Attending: INTERNAL MEDICINE
Payer: MEDICARE

## 2024-12-17 ENCOUNTER — HOSPITAL ENCOUNTER (OUTPATIENT)
Dept: GENERAL RADIOLOGY | Age: 69
Discharge: HOME | End: 2024-12-17
Attending: FAMILY MEDICINE
Payer: MEDICARE

## 2024-12-17 DIAGNOSIS — Z86.718 HISTORY OF RECURRENT DEEP VEIN THROMBOSIS: ICD-10-CM

## 2024-12-17 DIAGNOSIS — M81.0 OSTEOPOROSIS, UNSPECIFIED OSTEOPOROSIS TYPE, UNSPECIFIED PATHOLOGICAL FRACTURE PRESENCE: ICD-10-CM

## 2024-12-17 DIAGNOSIS — E78.00 HYPERCHOLESTEREMIA: ICD-10-CM

## 2024-12-17 DIAGNOSIS — M79.605 LEFT LEG PAIN: ICD-10-CM

## 2024-12-17 DIAGNOSIS — Z51.81 ANTICOAGULATION MANAGEMENT ENCOUNTER: ICD-10-CM

## 2024-12-17 DIAGNOSIS — Z79.01 ANTICOAGULATION MANAGEMENT ENCOUNTER: ICD-10-CM

## 2024-12-17 LAB
ALBUMIN SERPL-MCNC: 4.3 G/DL (ref 3.2–4.8)
ALBUMIN/GLOB SERPL: 3.1 {RATIO} (ref 1–2)
ALP LIVER SERPL-CCNC: 37 U/L
ALT SERPL-CCNC: 10 U/L
ANION GAP SERPL CALC-SCNC: <0 MMOL/L (ref 0–18)
AST SERPL-CCNC: 55 U/L (ref ?–34)
BASOPHILS # BLD AUTO: 0.03 X10(3) UL (ref 0–0.2)
BASOPHILS NFR BLD AUTO: 0.5 %
BILIRUB SERPL-MCNC: 0.5 MG/DL (ref 0.2–1.1)
BUN BLD-MCNC: 15 MG/DL (ref 9–23)
BUN/CREAT SERPL: 17 (ref 10–20)
CALCIUM BLD-MCNC: 9.2 MG/DL (ref 8.7–10.4)
CHLORIDE SERPL-SCNC: 107 MMOL/L (ref 98–112)
CHOLEST SERPL-MCNC: 202 MG/DL (ref ?–200)
CO2 SERPL-SCNC: 38 MMOL/L (ref 21–32)
CREAT BLD-MCNC: 0.88 MG/DL
D DIMER PPP FEU-MCNC: 0.27 UG/ML FEU (ref ?–0.68)
DEPRECATED RDW RBC AUTO: 41.4 FL (ref 35.1–46.3)
EGFRCR SERPLBLD CKD-EPI 2021: 72 ML/MIN/1.73M2 (ref 60–?)
EOSINOPHIL # BLD AUTO: 0.1 X10(3) UL (ref 0–0.7)
EOSINOPHIL NFR BLD AUTO: 1.7 %
ERYTHROCYTE [DISTWIDTH] IN BLOOD BY AUTOMATED COUNT: 12 % (ref 11–15)
FASTING PATIENT LIPID ANSWER: YES
FASTING STATUS PATIENT QL REPORTED: YES
GLOBULIN PLAS-MCNC: 1.4 G/DL (ref 2–3.5)
GLUCOSE BLD-MCNC: 80 MG/DL (ref 70–99)
HCT VFR BLD AUTO: 42 %
HDLC SERPL-MCNC: 64 MG/DL (ref 40–59)
HGB BLD-MCNC: 13.5 G/DL
IMM GRANULOCYTES # BLD AUTO: 0.02 X10(3) UL (ref 0–1)
IMM GRANULOCYTES NFR BLD: 0.3 %
LDLC SERPL CALC-MCNC: 124 MG/DL (ref ?–100)
LYMPHOCYTES # BLD AUTO: 1.85 X10(3) UL (ref 1–4)
LYMPHOCYTES NFR BLD AUTO: 30.7 %
MCH RBC QN AUTO: 30 PG (ref 26–34)
MCHC RBC AUTO-ENTMCNC: 32.1 G/DL (ref 31–37)
MCV RBC AUTO: 93.3 FL
MONOCYTES # BLD AUTO: 0.44 X10(3) UL (ref 0.1–1)
MONOCYTES NFR BLD AUTO: 7.3 %
NEUTROPHILS # BLD AUTO: 3.58 X10 (3) UL (ref 1.5–7.7)
NEUTROPHILS # BLD AUTO: 3.58 X10(3) UL (ref 1.5–7.7)
NEUTROPHILS NFR BLD AUTO: 59.5 %
NONHDLC SERPL-MCNC: 138 MG/DL (ref ?–130)
OSMOLALITY SERPL CALC.SUM OF ELEC: 294 MOSM/KG (ref 275–295)
PLATELET # BLD AUTO: 217 10(3)UL (ref 150–450)
POTASSIUM SERPL-SCNC: 4 MMOL/L (ref 3.5–5.1)
PROT SERPL-MCNC: 5.7 G/DL (ref 5.7–8.2)
RBC # BLD AUTO: 4.5 X10(6)UL
SODIUM SERPL-SCNC: 142 MMOL/L (ref 136–145)
TRIGL SERPL-MCNC: 79 MG/DL (ref 30–149)
VIT D+METAB SERPL-MCNC: 60.9 NG/ML (ref 30–100)
VLDLC SERPL CALC-MCNC: 14 MG/DL (ref 0–30)
WBC # BLD AUTO: 6 X10(3) UL (ref 4–11)

## 2024-12-17 PROCEDURE — 80053 COMPREHEN METABOLIC PANEL: CPT

## 2024-12-17 PROCEDURE — 72100 X-RAY EXAM L-S SPINE 2/3 VWS: CPT | Performed by: FAMILY MEDICINE

## 2024-12-17 PROCEDURE — 73502 X-RAY EXAM HIP UNI 2-3 VIEWS: CPT | Performed by: FAMILY MEDICINE

## 2024-12-17 PROCEDURE — 80061 LIPID PANEL: CPT

## 2024-12-17 PROCEDURE — 85379 FIBRIN DEGRADATION QUANT: CPT

## 2024-12-17 PROCEDURE — 73552 X-RAY EXAM OF FEMUR 2/>: CPT | Performed by: FAMILY MEDICINE

## 2024-12-17 PROCEDURE — 36415 COLL VENOUS BLD VENIPUNCTURE: CPT

## 2024-12-17 PROCEDURE — 85025 COMPLETE CBC W/AUTO DIFF WBC: CPT

## 2024-12-17 PROCEDURE — 82306 VITAMIN D 25 HYDROXY: CPT

## 2024-12-18 ENCOUNTER — TELEPHONE (OUTPATIENT)
Dept: ORTHOPEDICS CLINIC | Facility: CLINIC | Age: 69
End: 2024-12-18

## 2024-12-18 NOTE — TELEPHONE ENCOUNTER
New patient ref by Dr Martines for low back issues leg pain. Lumbar xrays in Epic. Please advise if additional imaging is needed.    Future Appointments   Date Time Provider Department Center   12/23/2024 11:00 AM Charbel Rudolph MD [facility]O 75 [facility]   3/6/2025 10:00 AM C[facility]STAL, PROCEDURE [id number] None   4/3/2025  7:40 AM [facility] RM[facility]

## 2024-12-19 ENCOUNTER — OFFICE VISIT (OUTPATIENT)
Dept: ORTHOPEDICS CLINIC | Facility: CLINIC | Age: 69
End: 2024-12-19
Payer: MEDICARE

## 2024-12-19 DIAGNOSIS — M48.062 LUMBAR STENOSIS WITH NEUROGENIC CLAUDICATION: Primary | ICD-10-CM

## 2024-12-19 PROCEDURE — G2211 COMPLEX E/M VISIT ADD ON: HCPCS | Performed by: STUDENT IN AN ORGANIZED HEALTH CARE EDUCATION/TRAINING PROGRAM

## 2024-12-19 PROCEDURE — 99204 OFFICE O/P NEW MOD 45 MIN: CPT | Performed by: STUDENT IN AN ORGANIZED HEALTH CARE EDUCATION/TRAINING PROGRAM

## 2024-12-19 NOTE — H&P
[facility] - ORTHOPEDICS  [address], [address], [place] [address]  [address] [place] [id number]  [phone]     NEW PATIENT VISIT - HISTORY AND PHYSICAL EXAMINATION     Name: Manda Patel   MRN: [id number]  Date: 24       CC: back and leg pain    REFERRED BY: Vaishali Martines MD    HPI:   Manda Patel is a very pleasant 69 year old female who presents today for evaluation of back and leg pain. The distribution of symptoms are: 50% backpain and 50% leg pain. The symptoms began 8 month(s) ago without any significant injury. Since the onset, the symptoms have become slowly worse over time. Patient feels pain is aggravated by walking, standing and improved by rest. The patient reports no numbness and no weakness.  The symptom characteristics are as follows: Patient is a 69-year-old female presenting with low back pain radiating down predominant left lower extremity worse with activity..     Prior spine surgery: none.    Bowel and bladder symptoms: absent.    The patient has not had issues with balance and/or hand dexterity problems such as changes in penmanship or the use of buttons or zippers.    Treatment up to this time has included:    Evaluation: PCP  NSAIDS: have worked well  Narcotic use: None  Physical therapy: None  Spinal injections: None  Others:       PMH:   Past Medical History:    Colon polyp    Constipation    Deep vein thrombosis (HCC)    GERD (gastroesophageal reflux disease)    Hemorrhoids    Hiatal hernia    High cholesterol    Renal disorder    Visual impairment    reading glassess       PAST SURGICAL HX:  Past Surgical History:   Procedure Laterality Date    Colonoscopy      [facility]    Colonoscopy N/A 2019    Procedure: COLONOSCOPY;  Surgeon: Edd Rod MD;  Location: [facility] ENDOSCOPY    Egd      [facility]    Hysterectomy      fibroid      1992    Other surgical history  1972    one ovary  removed/ cyst       FAMILY HX:  Family History   Problem Relation Age of Onset    Cancer Father 83        Colon    Cancer Mother 69        Breast    Breast Cancer Mother 65    Other (Other) Brother 68        epilepsy    Cancer Maternal Aunt     Cancer Maternal Aunt     Blood Clotting Disorder Neg        ALLERGIES:  Penicillins, Streptomycin, and Other    MEDICATIONS:   Current Outpatient Medications   Medication Sig Dispense Refill    alendronate (FOSAMAX) 70 MG Oral Tab Take 1 tablet (70 mg total) by mouth once a week. 12 tablet 3    rivaroxaban 10 MG Oral Tab Take 1 tablet (10 mg total) by mouth daily with food. 90 tablet 3    Na Sulfate-K Sulfate-Mg Sulf (SUPREP BOWEL PREP KIT) 17.5-3.13-1.6 GM/177ML Oral Solution Take as directed 1 each 0    atorvastatin 10 MG Oral Tab Take 1 tablet (10 mg total) by mouth nightly.         ROS: A comprehensive 14 point review of systems was performed and was negative aside from the aforementioned per history of present illness.    SOCIAL HX:  Social History     Tobacco Use    Smoking status: Never    Smokeless tobacco: Never   Substance Use Topics    Alcohol use: No         PE:   There were no vitals filed for this visit.  Estimated body mass index is 27.88 kg/m² as calculated from the following:    Height as of 12/16/24: 5' 7\" (1.702 m).    Weight as of 12/16/24: 178 lb (80.7 kg).    Physical Exam  Constitutional:       Appearance: Normal appearance.   HENT:      Head: Normocephalic and atraumatic.   Eyes:      Extraocular Movements: Extraocular movements intact.   Cardiovascular:      Pulses: Normal pulses. Skin warm and well perfused.  Pulmonary:      Effort: Pulmonary effort is normal. No respiratory distress.   Skin:     General: Skin is warm.   Psychiatric:         Mood and Affect: Mood normal.     Spine Exam:    Normal gait without difficulty  Able to heel, toe, tandem gait without difficulty  Level shoulders and hips in even stance    Restricted L-spine ROM    No  tenderness to palpation of L-spine    Straight leg raise test: negative    Sustained clonus: negative    LE Strength: 5/5 IP QUAD TA EHL GSC  LE Sensation: normal in L2-S1 distribution  LE reflexes: normal    Radiographic Examination/Diagnostics:  XR personally viewed, independently interpreted and radiology report was reviewed.  X-ray of the lumbar spine demonstrates diffuse degenerative changes    IMPRESSION: Manda Patel is a 69 year old female with back pain radiating down left lower extremity likely lumbar stenosis with radiculopathy    PLAN:     We reviewed the patients history, symptoms, exam findings, and imaging today.  We had a detailed discussion outlining the etiology, anatomy, pathophysiology, and natural history of lumbar stenosis. The typical management of this condition may include lifestyle modification, NSAIDs, physical therapy, oral steroids, epidural injections, neuromodulatory medications, and sometimes pain medications.  Based on our discussion today we would like to have the patient initiate our recommendations for continued conservative therapy in the treatment of their condition noted in the assessment section.     - Provided home exercise program  - Referred to Physical Therapy: home exercise program, aerobic exercises, core strengthening and conditioning, possible manipulative therapy,  and modalities as indicated  - Consider MRI if symptoms do not improve    FOLLOW-UP:  We will see her back in follow-up in 2 months, or sooner if any problems arise. Patient understands and agrees with plan.      Charbel Rudolph MD  Orthopedic Spine Surgeon  [facility]   [address] [place] [address].org  [email or web address]  t: [phone]   f: [phone]        This note was dictated using Dragon software.  While it was briefly proofread prior to completion, some grammatical, spelling, and word choice errors  due to dictation may still occur.

## 2024-12-21 DIAGNOSIS — E78.00 HYPERCHOLESTEREMIA: Primary | ICD-10-CM

## 2024-12-21 RX ORDER — ATORVASTATIN CALCIUM 20 MG/1
20 TABLET, FILM COATED ORAL NIGHTLY
Qty: 90 TABLET | Refills: 1 | Status: SHIPPED | OUTPATIENT
Start: 2024-12-21

## 2025-02-25 PROBLEM — E78.5 DYSLIPIDEMIA: Status: ACTIVE | Noted: 2023-02-17

## 2025-02-25 PROBLEM — R00.2 PALPITATIONS: Status: ACTIVE | Noted: 2023-11-02

## 2025-03-06 PROBLEM — K64.8 INTERNAL HEMORRHOIDS: Status: ACTIVE | Noted: 2025-03-06

## 2025-03-06 PROCEDURE — 88305 TISSUE EXAM BY PATHOLOGIST: CPT | Performed by: INTERNAL MEDICINE

## 2025-03-25 ENCOUNTER — TELEPHONE (OUTPATIENT)
Dept: FAMILY MEDICINE CLINIC | Facility: CLINIC | Age: 70
End: 2025-03-25

## 2025-03-25 DIAGNOSIS — E78.00 HYPERCHOLESTEREMIA: Primary | ICD-10-CM

## 2025-03-25 NOTE — TELEPHONE ENCOUNTER
Patient calling to state was suppose to repeat lipid and metabolic panel, asking for orders to be placed, and if anything else needs to be done.

## 2025-03-25 NOTE — TELEPHONE ENCOUNTER
Vaishali Martines MD  12/21/2024 11:14 AM CST Back to Top      Cholesterol levels are higher and not well controlled. Would recommend increasing atorvastatin to 20mg daily  Recommend low fat low cholesterol diet  Follow up and repeat labs 3 months  One liver enzyme slightly elevated and may be from the elevated cholesterol  Recheck 3 months  Results released through My Chart.

## 2025-03-27 ENCOUNTER — LAB ENCOUNTER (OUTPATIENT)
Dept: LAB | Age: 70
End: 2025-03-27
Attending: FAMILY MEDICINE
Payer: MEDICARE

## 2025-03-27 DIAGNOSIS — E78.00 HYPERCHOLESTEREMIA: ICD-10-CM

## 2025-03-27 LAB
ALBUMIN SERPL-MCNC: 4.3 G/DL (ref 3.2–4.8)
ALBUMIN/GLOB SERPL: 1.8 {RATIO} (ref 1–2)
ALP LIVER SERPL-CCNC: 41 U/L
ALT SERPL-CCNC: 11 U/L
ANION GAP SERPL CALC-SCNC: 6 MMOL/L (ref 0–18)
AST SERPL-CCNC: 18 U/L (ref ?–34)
BILIRUB SERPL-MCNC: 0.6 MG/DL (ref 0.2–1.1)
BUN BLD-MCNC: 22 MG/DL (ref 9–23)
BUN/CREAT SERPL: 24.2 (ref 10–20)
CALCIUM BLD-MCNC: 9.1 MG/DL (ref 8.7–10.4)
CHLORIDE SERPL-SCNC: 109 MMOL/L (ref 98–112)
CHOLEST SERPL-MCNC: 208 MG/DL (ref ?–200)
CO2 SERPL-SCNC: 28 MMOL/L (ref 21–32)
CREAT BLD-MCNC: 0.91 MG/DL
EGFRCR SERPLBLD CKD-EPI 2021: 69 ML/MIN/1.73M2 (ref 60–?)
FASTING PATIENT LIPID ANSWER: YES
FASTING STATUS PATIENT QL REPORTED: YES
GLOBULIN PLAS-MCNC: 2.4 G/DL (ref 2–3.5)
GLUCOSE BLD-MCNC: 104 MG/DL (ref 70–99)
HDLC SERPL-MCNC: 71 MG/DL (ref 40–59)
LDLC SERPL CALC-MCNC: 124 MG/DL (ref ?–100)
NONHDLC SERPL-MCNC: 137 MG/DL (ref ?–130)
OSMOLALITY SERPL CALC.SUM OF ELEC: 300 MOSM/KG (ref 275–295)
POTASSIUM SERPL-SCNC: 4.5 MMOL/L (ref 3.5–5.1)
PROT SERPL-MCNC: 6.7 G/DL (ref 5.7–8.2)
SODIUM SERPL-SCNC: 143 MMOL/L (ref 136–145)
TRIGL SERPL-MCNC: 74 MG/DL (ref 30–149)
VLDLC SERPL CALC-MCNC: 13 MG/DL (ref 0–30)

## 2025-03-27 PROCEDURE — 80053 COMPREHEN METABOLIC PANEL: CPT

## 2025-03-27 PROCEDURE — 80061 LIPID PANEL: CPT

## 2025-03-27 PROCEDURE — 36415 COLL VENOUS BLD VENIPUNCTURE: CPT

## 2025-04-01 ENCOUNTER — TELEPHONE (OUTPATIENT)
Facility: CLINIC | Age: 70
End: 2025-04-01

## 2025-04-02 NOTE — TELEPHONE ENCOUNTER
----- Message from Edd Rod sent at 4/1/2025  8:08 PM CDT -----  GI RNs - please recall for colonoscopy exam in 7yrs

## 2025-04-02 NOTE — TELEPHONE ENCOUNTER
7  year colonoscopy recall entered. Health maintenance updated.    Colonoscopy done on 3/6/25 and next due on 3/6/32.

## 2025-04-03 ENCOUNTER — HOSPITAL ENCOUNTER (OUTPATIENT)
Dept: MAMMOGRAPHY | Age: 70
Discharge: HOME | End: 2025-04-03
Attending: FAMILY MEDICINE
Payer: MEDICARE

## 2025-04-03 DIAGNOSIS — Z12.31 ENCOUNTER FOR SCREENING MAMMOGRAM FOR BREAST CANCER: ICD-10-CM

## 2025-04-03 PROCEDURE — 77067 SCR MAMMO BI INCL CAD: CPT | Performed by: FAMILY MEDICINE

## 2025-04-03 PROCEDURE — 77063 BREAST TOMOSYNTHESIS BI: CPT | Performed by: FAMILY MEDICINE

## 2025-04-04 LAB — HM MAMMOGRAPHY BILATERAL: NORMAL

## 2025-04-14 ENCOUNTER — OFFICE VISIT (OUTPATIENT)
Dept: FAMILY MEDICINE CLINIC | Facility: CLINIC | Age: 70
End: 2025-04-14
Payer: MEDICARE

## 2025-04-14 VITALS
BODY MASS INDEX: 27.94 KG/M2 | DIASTOLIC BLOOD PRESSURE: 72 MMHG | SYSTOLIC BLOOD PRESSURE: 104 MMHG | TEMPERATURE: 99 F | HEART RATE: 66 BPM | HEIGHT: 67 IN | WEIGHT: 178 LBS

## 2025-04-14 DIAGNOSIS — M47.26 OSTEOARTHRITIS OF SPINE WITH RADICULOPATHY, LUMBAR REGION: ICD-10-CM

## 2025-04-14 DIAGNOSIS — M16.0 PRIMARY OSTEOARTHRITIS OF BOTH HIPS: ICD-10-CM

## 2025-04-14 DIAGNOSIS — R73.9 HYPERGLYCEMIA: ICD-10-CM

## 2025-04-14 DIAGNOSIS — E78.00 HYPERCHOLESTEREMIA: Primary | ICD-10-CM

## 2025-04-14 DIAGNOSIS — D22.9 MULTIPLE NEVI: ICD-10-CM

## 2025-04-14 DIAGNOSIS — R41.3 MEMORY CHANGES: ICD-10-CM

## 2025-04-14 DIAGNOSIS — R25.1 TREMOR: ICD-10-CM

## 2025-04-14 DIAGNOSIS — L60.8 CHANGE IN NAIL APPEARANCE: ICD-10-CM

## 2025-04-14 PROCEDURE — 99214 OFFICE O/P EST MOD 30 MIN: CPT | Performed by: FAMILY MEDICINE

## 2025-04-14 NOTE — PROGRESS NOTES
HPI:    Patient ID: Manda Patel is a 69 year old female.      HPI    Chief Complaint   Patient presents with    Cholesterol     Follow up       Tremors     For many years shaking on head getting worst        Wt Readings from Last 6 Encounters:   04/14/25 178 lb (80.7 kg)   02/25/25 174 lb (78.9 kg)   12/16/24 178 lb (80.7 kg)   12/09/24 177 lb 12.8 oz (80.6 kg)   02/05/24 175 lb (79.4 kg)   12/01/23 175 lb (79.4 kg)     BP Readings from Last 3 Encounters:   04/14/25 104/72   03/06/25 100/61   12/16/24 110/74     Component      Latest Ref Rng 10/27/2023 12/17/2024 3/27/2025   Glucose      70 - 99 mg/dL 105 (H)  80  104 (H)    Sodium      136 - 145 mmol/L 143  142  143    Potassium      3.5 - 5.1 mmol/L 4.5  4.0  4.5    Chloride      98 - 112 mmol/L 112  107  109    Carbon Dioxide, Total      21.0 - 32.0 mmol/L 28.0  38.0 (H)  28.0    ANION GAP      0 - 18 mmol/L 3  <0 (L)  6    BUN      9 - 23 mg/dL 15  15  22    CREATININE      0.55 - 1.02 mg/dL 0.89  0.88  0.91    BUN/CREATININE RATIO      10.0 - 20.0  16.9  17.0  24.2 (H)    CALCIUM      8.7 - 10.4 mg/dL 8.9  9.2  9.1    CALCULATED OSMOLALITY      275 - 295 mOsm/kg 297 (H)  294  300 (H)    EGFR      >=60 mL/min/1.73m2 71  72  69    ALT (SGPT)      10 - 49 U/L 19  10  11    AST (SGOT)      <34 U/L 21  55 (H)  18    ALKALINE PHOSPHATASE      55 - 142 U/L 51 (L)  37 (L)  41 (L)    Total Bilirubin      0.2 - 1.1 mg/dL 0.5  0.5  0.6    PROTEIN, TOTAL      5.7 - 8.2 g/dL 7.0  5.7  6.7    Albumin      3.2 - 4.8 g/dL 3.8  4.3  4.3    Globulin      2.0 - 3.5 g/dL 3.2  1.4 (L)  2.4    A/G Ratio      1.0 - 2.0  1.2  3.1 (H)  1.8    Patient Fasting for CMP? Yes  Yes  Yes    Cholesterol, Total      <200 mg/dL 192  202 (H)  208 (H)    HDL Cholesterol      40 - 59 mg/dL 77 (H)  64 (H)  71 (H)    Triglycerides      30 - 149 mg/dL 60  79  74    LDL Cholesterol Calc      <100 mg/dL 104 (H)  124 (H)  124 (H)    VLDL      0 - 30 mg/dL 10  14  13    NON-HDL CHOLESTEROL      <130  mg/dL 115  138 (H)  137 (H)    Patient Fasting for Lipid? Yes  Yes  Yes       Legend:  (H) High  (L) Low    Went to body works and has manipulation/ chiropractric work.  She had injections (saline) in her muscles.  She does a lot of stretching daily.    Feels her memory is getting bad.  Feels happening since 2 years.  Gets forgetful in grocery stores wit her list   Will forget name of actors.    Also c/o head shaking and cannot rest head steady.  Used to have tremors in her hands    Review of Systems   Constitutional:  Negative for chills and fever.   Musculoskeletal:  Positive for myalgias.   Neurological:  Positive for tremors.   Psychiatric/Behavioral:  Positive for decreased concentration. Negative for behavioral problems. The patient is not nervous/anxious.        /72   Pulse 66   Temp 98.8 °F (37.1 °C) (Temporal)   Ht 5' 7\" (1.702 m)   Wt 178 lb (80.7 kg)   BMI 27.88 kg/m²        Current Medications[1]  Allergies:Allergies[2]   PHYSICAL EXAM:     Chief Complaint   Patient presents with    Cholesterol     Follow up       Tremors     For many years shaking on head getting worst       Physical Exam  Vitals reviewed.   Constitutional:       Appearance: Normal appearance.   Musculoskeletal:      Cervical back: Normal range of motion and neck supple.   Skin:     Comments: Multiple nevi.  She also has vertical striae on her nails    Neurological:      Mental Status: She is alert.                ASSESSMENT/PLAN:     Encounter Diagnoses   Name Primary?    Hypercholesteremia Yes    Osteoarthritis of spine with radiculopathy, lumbar region     Primary osteoarthritis of both hips     Tremor     Memory changes     Hyperglycemia     Multiple nevi     Change in nail appearance        1. Hypercholesteremia  Reviewed cholesterol values.  Recommend stopping atorvastatin for now to see if her muscle pain improves.  Also recommend completing CT calcium score.  Maintain low-fat low-cholesterol diet.  Patient agrees with  this plan and will touch base after her CT calcium score test  - CT CALCIUM SCORING; Future    2. Osteoarthritis of spine with radiculopathy, lumbar region  Plans to [facility]opedic at [facility]    3. Primary osteoarthritis of both hips  Plans to see orthopedics     4. Tremor  Head tremor  Follow up neuro  - NEURO - INTERNAL    5. Memory changes    - NEURO - INTERNAL  - TSH W Reflex To Free T4; Future  - Vitamin B12; Future  - CBC With Differential With Platelet; Future  - T Pallidum Screening Cascade; Future    6. Hyperglycemia    - Hemoglobin A1C; Future    7. Multiple nevi    - DERM - INTERNAL    8. Change in nail appearance    - DERM - INTERNAL      Orders Placed This Encounter   Procedures    TSH W Reflex To Free T4    Vitamin B12    CBC With Differential With Platelet    T Pallidum Screening Cascade    Hemoglobin A1C         The above note was creating using Dragon speech recognition technology. Please excuse any typos    Meds This Visit:  Requested Prescriptions      No prescriptions requested or ordered in this encounter       Imaging & Referrals:  NEURO - INTERNAL  DERM - INTERNAL  CT CALCIUM SCORING       ID#[id number]       [1]   Current Outpatient Medications   Medication Sig Dispense Refill    alendronate (FOSAMAX) 70 MG Oral Tab Take 1 tablet (70 mg total) by mouth once a week. 12 tablet 3    rivaroxaban 10 MG Oral Tab Take 1 tablet (10 mg total) by mouth daily with food. 90 tablet 3    Na Sulfate-K Sulfate-Mg Sulf (SUPREP BOWEL PREP KIT) 17.5-3.13-1.6 GM/177ML Oral Solution Take as directed 1 each 0   [2]   Allergies  Allergen Reactions    Penicillins RASH    Streptomycin RASH    Other      levomicetine

## 2025-04-16 ENCOUNTER — LAB ENCOUNTER (OUTPATIENT)
Dept: LAB | Age: 70
End: 2025-04-16
Attending: FAMILY MEDICINE
Payer: MEDICARE

## 2025-04-16 DIAGNOSIS — R73.9 HYPERGLYCEMIA: ICD-10-CM

## 2025-04-16 DIAGNOSIS — R41.3 MEMORY CHANGES: ICD-10-CM

## 2025-04-16 LAB
BASOPHILS # BLD AUTO: 0.06 X10(3) UL (ref 0–0.2)
BASOPHILS NFR BLD AUTO: 1 %
DEPRECATED RDW RBC AUTO: 40.7 FL (ref 35.1–46.3)
EOSINOPHIL # BLD AUTO: 0.09 X10(3) UL (ref 0–0.7)
EOSINOPHIL NFR BLD AUTO: 1.5 %
ERYTHROCYTE [DISTWIDTH] IN BLOOD BY AUTOMATED COUNT: 12 % (ref 11–15)
EST. AVERAGE GLUCOSE BLD GHB EST-MCNC: 111 MG/DL (ref 68–126)
HBA1C MFR BLD: 5.5 % (ref ?–5.7)
HCT VFR BLD AUTO: 40.9 % (ref 35–48)
HGB BLD-MCNC: 13.4 G/DL (ref 12–16)
IMM GRANULOCYTES # BLD AUTO: 0.01 X10(3) UL (ref 0–1)
IMM GRANULOCYTES NFR BLD: 0.2 %
LYMPHOCYTES # BLD AUTO: 1.71 X10(3) UL (ref 1–4)
LYMPHOCYTES NFR BLD AUTO: 28.9 %
MCH RBC QN AUTO: 30 PG (ref 26–34)
MCHC RBC AUTO-ENTMCNC: 32.8 G/DL (ref 31–37)
MCV RBC AUTO: 91.5 FL (ref 80–100)
MONOCYTES # BLD AUTO: 0.48 X10(3) UL (ref 0.1–1)
MONOCYTES NFR BLD AUTO: 8.1 %
NEUTROPHILS # BLD AUTO: 3.56 X10 (3) UL (ref 1.5–7.7)
NEUTROPHILS # BLD AUTO: 3.56 X10(3) UL (ref 1.5–7.7)
NEUTROPHILS NFR BLD AUTO: 60.3 %
PLATELET # BLD AUTO: 212 10(3)UL (ref 150–450)
RBC # BLD AUTO: 4.47 X10(6)UL (ref 3.8–5.3)
T PALLIDUM AB SER QL IA: NONREACTIVE
TSI SER-ACNC: 1.85 UIU/ML (ref 0.55–4.78)
VIT B12 SERPL-MCNC: 751 PG/ML (ref 211–911)
WBC # BLD AUTO: 5.9 X10(3) UL (ref 4–11)

## 2025-04-16 PROCEDURE — 86780 TREPONEMA PALLIDUM: CPT

## 2025-04-16 PROCEDURE — 84443 ASSAY THYROID STIM HORMONE: CPT

## 2025-04-16 PROCEDURE — 82607 VITAMIN B-12: CPT

## 2025-04-16 PROCEDURE — 85025 COMPLETE CBC W/AUTO DIFF WBC: CPT

## 2025-04-16 PROCEDURE — 83036 HEMOGLOBIN GLYCOSYLATED A1C: CPT

## 2025-04-16 PROCEDURE — 36415 COLL VENOUS BLD VENIPUNCTURE: CPT

## 2025-04-23 ENCOUNTER — HOSPITAL ENCOUNTER (OUTPATIENT)
Dept: CT IMAGING | Age: 70
Discharge: HOME | End: 2025-04-23
Attending: FAMILY MEDICINE

## 2025-04-23 DIAGNOSIS — E78.00 HYPERCHOLESTEREMIA: ICD-10-CM

## 2025-05-08 DIAGNOSIS — I31.39 PERICARDIAL EFFUSION (HCC): Primary | ICD-10-CM

## 2025-05-20 ENCOUNTER — OFFICE VISIT (OUTPATIENT)
Dept: DERMATOLOGY CLINIC | Facility: CLINIC | Age: 70
End: 2025-05-20
Payer: MEDICARE

## 2025-05-20 ENCOUNTER — TELEPHONE (OUTPATIENT)
Dept: FAMILY MEDICINE CLINIC | Facility: CLINIC | Age: 70
End: 2025-05-20

## 2025-05-20 DIAGNOSIS — L82.0 INFLAMED SEBORRHEIC KERATOSIS: ICD-10-CM

## 2025-05-20 DIAGNOSIS — L57.0 ACTINIC KERATOSIS: ICD-10-CM

## 2025-05-20 DIAGNOSIS — D22.9 MULTIPLE BENIGN NEVI: ICD-10-CM

## 2025-05-20 DIAGNOSIS — D18.01 CHERRY ANGIOMA: ICD-10-CM

## 2025-05-20 DIAGNOSIS — E78.00 HYPERCHOLESTEREMIA: Primary | ICD-10-CM

## 2025-05-20 DIAGNOSIS — L81.4 LENTIGINES: ICD-10-CM

## 2025-05-20 DIAGNOSIS — L82.1 SEBORRHEIC KERATOSES: Primary | ICD-10-CM

## 2025-05-20 DIAGNOSIS — L72.0 MILIA: ICD-10-CM

## 2025-05-20 PROCEDURE — 17000 DESTRUCT PREMALG LESION: CPT | Performed by: STUDENT IN AN ORGANIZED HEALTH CARE EDUCATION/TRAINING PROGRAM

## 2025-05-20 PROCEDURE — 17111 DESTRUCTION B9 LESIONS 15/>: CPT | Performed by: STUDENT IN AN ORGANIZED HEALTH CARE EDUCATION/TRAINING PROGRAM

## 2025-05-20 PROCEDURE — 99214 OFFICE O/P EST MOD 30 MIN: CPT | Performed by: STUDENT IN AN ORGANIZED HEALTH CARE EDUCATION/TRAINING PROGRAM

## 2025-05-20 NOTE — PROGRESS NOTES
Established patient     Referred by:   No referring provider defined for this encounter.     CHIEF COMPLAINT: FBSE    HISTORY OF PRESENT ILLNESS: .      - No particular lesions of concern.       DERM HISTORY:  History of skin cancer: No  History of melanoma: No    FAMILY HISTORY:  History of melanoma: No    PAST MEDICAL HISTORY:  Past Medical History[1]    REVIEW OF SYSTEMS:  Constitutional: Denies fever, chills, unintentional weight loss.   Skin as per HPI    Medications  Current Medications[2]    PHYSICAL EXAM:  Physician accompanied by chaperone during examination   General: awake, alert, no acute distress  Skin: Skin exam was performed today including the following: head and face, scalp, neck, chest (including breasts and axillae), abdomen, back, bilateral upper extremities, bilateral lower extremities, hands, feet, digits, nails. Pertinent findings include:   - Scattered bright red-purple dome-shaped papules on the trunk and extremities   - Scattered light brown stellate macules on sun exposed sites  - Scattered, evenly colored, round brown macules and papules with regular borders on the trunk and extremities  - Numerous scattered skin-colored and brown, waxy, stuck-on papules and plaques on the trunk and extremities  - scalp with inflamed brown stuck on papules   - scalp with a white dome shaped papule  - upper lip with a pink gritty papule    ASSESSMENT & PLAN:  Pathophysiology of diagnoses discussed with patient.  Therapeutic options reviewed. Risks, benefits, and alternatives discussed with patient. Instructions reviewed at length.    #Lentigines  #Seborrheic keratoses   #Cherry angiomas   - Reassurance provided regarding the benign nature of these lesions.    #Multiple benign nevi  - Complete skin exam performed today with no outlier lesions identified   - Reassured patient of benign nature of these lesions.   - Recommend daily photoprotection with broad-spectrum sunscreen, avoidance of sun during peak  hours, and sun protective clothing.    - Dermoscopy was used for physical examination of pigmented lesions during today's office visit.    #Inflamed seborrheic keratosis  - Reassured regarding benign nature of lesion   - Cryotherapy today. Medically necessary as lesion inflamed and irritated.    - Cryosurgery of non-malignant lesion(s)  - Risks, benefits, alternatives and personnel required for cryosurgery reviewed with patient. Pt verbalizes understanding and wishes to proceed.   - Cryosurgery performed with Liquid Nitrogen via cryostat spray gun to ISK. 16 lesion(s) treated.   - Patient tolerated well and wound care discussed.     #Milia  - We discussed the diagnosis, risk/benefit of treatment options,and prognosis at length. Milia are tiny cysts that commonly occur around the eyes due to chronic rubbing.  Treatment involves destruction of the lesions (extraction) vs keratin normalization using a topical retinoid (used with variable success).     - Procedure Note  1 lesions were extracted on the scalp today.  Site(s) prepped with alcohol and anesthetized with 1% lidocaine with epinephrine.   After risk/benefits were discussed (including scar, dyspigmentation, recurrence) and verbal consent was obtained, a sterile 30G needle was used to puncture surface of the cyst. A comedone extractor was then used to expel contents. Patient tolerated procedure without complications. Site(s) were dressed with vaseline and bandage(s) applied as necessary. Wound care instructions were provided.    #Actinic Keratosis  - Discussed premalignant etiology and possibility of transformation to SCC  - Recommended cryotherapy today   - Discussed side effects including redness, swelling, crusting, and discolortion after treatment, wound care with soap/water and vaseline     - Procedure Note Cryosurgery of pre-malignant lesion(s)  Risks, benefits, alternatives, complications, and personnel required for cryosurgery reviewed with patient.  Patient verbalizes understanding and wishes to proceed.   - Cryosurgery performed with Liquid Nitrogen via cryostat spray gun to Actinic Keratosis . 1 lesion(s) treated.   - Patient tolerated well and wound care discussed. Return if lesions fail to fully resolve.    Return to clinic: 1 year  or sooner if something concerning arises     El Bowers MD    By signing my name below, Susannah DAVID MA,  attest that this documentation has been prepared under the direction and in the presence of El Bowers MD.   Electronically Signed: Susannah JOHNSON MA, 5/20/2025, 11:34 AM.    IEl MD,  personally performed the services described in this documentation. All medical record entries made by the scribe were at my direction and in my presence.  I have reviewed the chart and agree that the record reflects my personal performance and is accurate and complete.  El Bowers MD, 5/20/2025, 12:43 PM             [1]   Past Medical History:   Colon polyp    Constipation    Deep vein thrombosis (HCC)    3+ yrs ago after 2018 sx on leg    GERD (gastroesophageal reflux disease)    Hemorrhoids    Hiatal hernia    High cholesterol    Renal disorder    Visual impairment    reading glassess   [2]   Current Outpatient Medications   Medication Sig Dispense Refill    alendronate (FOSAMAX) 70 MG Oral Tab Take 1 tablet (70 mg total) by mouth once a week. 12 tablet 3    rivaroxaban 10 MG Oral Tab Take 1 tablet (10 mg total) by mouth daily with food. 90 tablet 3

## 2025-06-04 ENCOUNTER — LAB ENCOUNTER (OUTPATIENT)
Dept: LAB | Age: 70
End: 2025-06-04
Attending: FAMILY MEDICINE
Payer: MEDICARE

## 2025-06-04 DIAGNOSIS — E78.00 HYPERCHOLESTEREMIA: ICD-10-CM

## 2025-06-04 LAB
CHOLEST SERPL-MCNC: 264 MG/DL (ref ?–200)
FASTING PATIENT LIPID ANSWER: YES
HDLC SERPL-MCNC: 73 MG/DL (ref 40–59)
LDLC SERPL CALC-MCNC: 176 MG/DL (ref ?–100)
NONHDLC SERPL-MCNC: 191 MG/DL (ref ?–130)
TRIGL SERPL-MCNC: 89 MG/DL (ref 30–149)
VLDLC SERPL CALC-MCNC: 18 MG/DL (ref 0–30)

## 2025-06-04 PROCEDURE — 80061 LIPID PANEL: CPT

## 2025-06-04 PROCEDURE — 36415 COLL VENOUS BLD VENIPUNCTURE: CPT

## 2025-06-09 ENCOUNTER — HOSPITAL ENCOUNTER (OUTPATIENT)
Age: 70
Discharge: HOME | End: 2025-06-09
Attending: EMERGENCY MEDICINE
Payer: MEDICARE

## 2025-06-09 VITALS
TEMPERATURE: 97 F | SYSTOLIC BLOOD PRESSURE: 101 MMHG | DIASTOLIC BLOOD PRESSURE: 60 MMHG | HEART RATE: 76 BPM | RESPIRATION RATE: 20 BRPM | OXYGEN SATURATION: 96 %

## 2025-06-09 DIAGNOSIS — N30.00 ACUTE CYSTITIS WITHOUT HEMATURIA: Primary | ICD-10-CM

## 2025-06-09 LAB
BILIRUB UR QL STRIP: NEGATIVE
GLUCOSE UR STRIP-MCNC: 100 MG/DL
HGB UR QL STRIP: NEGATIVE
KETONES UR STRIP-MCNC: NEGATIVE MG/DL
LEUKOCYTE ESTERASE UR QL STRIP: NEGATIVE
NITRITE UR QL STRIP: POSITIVE
PH UR STRIP: 5 [PH]
SP GR UR STRIP: 1.01
UROBILINOGEN UR STRIP-ACNC: <2 MG/DL

## 2025-06-09 PROCEDURE — 81002 URINALYSIS NONAUTO W/O SCOPE: CPT

## 2025-06-09 PROCEDURE — 99213 OFFICE O/P EST LOW 20 MIN: CPT

## 2025-06-09 RX ORDER — SULFAMETHOXAZOLE AND TRIMETHOPRIM 800; 160 MG/1; MG/1
1 TABLET ORAL 2 TIMES DAILY
Qty: 6 TABLET | Refills: 0 | Status: SHIPPED | OUTPATIENT
Start: 2025-06-09 | End: 2025-06-12

## 2025-06-09 RX ORDER — PHENAZOPYRIDINE HYDROCHLORIDE 200 MG/1
200 TABLET, FILM COATED ORAL 3 TIMES DAILY PRN
Qty: 6 TABLET | Refills: 0 | Status: SHIPPED | OUTPATIENT
Start: 2025-06-09 | End: 2025-06-11

## 2025-06-09 NOTE — ED PROVIDER NOTES
Patient Seen in: [facility]        History  Chief Complaint   Patient presents with    Urinary Symptoms     Stated Complaint: uti    Subjective:   HPI     Manda Patel is a 70 year old female who presents with symptoms suggestive of a urinary tract infection.    She has been experiencing lower abdominal pain since yesterday evening, describing it as 'painful bottom of my belly'. The pain is accompanied by increased urinary frequency, needing to urinate every few minutes, and urination provides some relief, as she states 'when I pee, I feel like better'.    She has a history of urinary tract infections but does not recall the last occurrence. Yesterday, she took amoxicillin, an antibiotic from the penicillin family, despite having a penicillin allergy from many years ago. She reports no adverse reactions after taking the amoxicillin.    No back pain, fever, or vomiting.        Objective:     Past Medical History:    Colon polyp    Constipation    Deep vein thrombosis (HCC)    3+ yrs ago after 2018 sx on leg    GERD (gastroesophageal reflux disease)    Hemorrhoids    Hiatal hernia    High cholesterol    Renal disorder    Visual impairment    reading glassess              Past Surgical History:   Procedure Laterality Date    Colonoscopy      [facility]    Colonoscopy N/A 2019    Procedure: COLONOSCOPY;  Surgeon: Edd Rod MD;  Location: [facility] ENDOSCOPY    Colonoscopy N/A 3/6/2025    Procedure: COLONOSCOPY;  Surgeon: Edd Rod MD;  Location: [facility] MAIN OR    Egd      [facility]    Hysterectomy      fibroid      1992    Other surgical history  1972    one ovary removed/ cyst                Social History     Socioeconomic History    Marital status:    Occupational History    Occupation: Retired    Tobacco Use    Smoking status: Never    Smokeless tobacco: Never   Vaping Use    Vaping status: Never Used   Substance and Sexual  Activity    Alcohol use: No    Drug use: No   Other Topics Concern    Pt has a pacemaker No    Pt has a defibrillator No    Reaction to local anesthetic No     Social Drivers of Health     Food Insecurity: No Food Insecurity (10/21/2024)    Received from [facility]    Hunger Vital Sign     Worried About Running Out of Food in the Last Year: Never true     Ran Out of Food in the Last Year: Never true   Transportation Needs: No Transportation Needs (10/21/2024)    Received from [facility]    PRAPARE - Transportation     Lack of Transportation (Medical): No     Lack of Transportation (Non-Medical): No   Housing Stability: Unknown (10/21/2024)    Received from [facility]    Housing Stability Vital Sign     Unable to Pay for Housing in the Last Year: No              Review of Systems    Positive for stated complaint: uti  Other systems are as noted in HPI.  Constitutional and vital signs reviewed.      All other systems reviewed and negative except as noted above.                  Physical Exam    ED Triage Vitals [06/09/25 0834]   /60   Pulse 76   Resp 20   Temp 97.4 °F (36.3 °C)   Temp src Oral   SpO2 96 %   O2 Device None (Room air)       Current Vitals:   Vital Signs  BP: 101/60  Pulse: 76  Resp: 20  Temp: 97.4 °F (36.3 °C)  Temp src: Oral    Oxygen Therapy  SpO2: 96 %  O2 Device: None (Room air)            Physical Exam  Vitals and nursing note reviewed.   Constitutional:       Appearance: Normal appearance. She is well-developed.   HENT:      Head: Normocephalic and atraumatic.   Cardiovascular:      Rate and Rhythm: Normal rate and regular rhythm.      Heart sounds: Normal heart sounds.   Pulmonary:      Effort: Pulmonary effort is normal. No respiratory distress.   Abdominal:      General: There is no distension.      Palpations: Abdomen is soft.      Tenderness: There is no abdominal tenderness. There is no right CVA tenderness or left CVA  tenderness.   Skin:     General: Skin is warm and dry.      Capillary Refill: Capillary refill takes less than 2 seconds.   Neurological:      General: No focal deficit present.      Mental Status: She is alert.   Psychiatric:         Mood and Affect: Mood normal.         Behavior: Behavior normal.                ED Course  Labs Reviewed   [facility] POCT URINALYSIS DIPSTICK - Abnormal; Notable for the following components:       Result Value    Urine Color Jennifer (*)     Urine Clarity Slightly cloudy (*)     Protein urine Trace (*)     Glucose, Urine 100 (*)     Nitrite Urine Positive (*)     All other components within normal limits                          MDM  Urinary tract infection, overactive bladder, interstitial cystitis all in differential.   Urinalysis reviewed by myself. Positive nitrite. Culture pending at time of discharge. Will treat with bactrim and pyridium as prescribed.         Medical Decision Making      Disposition and Plan     Clinical Impression:  1. Acute cystitis without hematuria         Disposition:  Discharge  6/9/2025  8:44 am    Follow-up:  Vaishali Martines MD  [address] [place], [place] [id number] [phone]      As needed          Medications Prescribed:  Current Discharge Medication List        START taking these medications    Details   sulfamethoxazole-trimethoprim -160 MG Oral Tab per tablet Take 1 tablet by mouth 2 (two) times daily for 3 days.  Qty: 6 tablet, Refills: 0      phenazopyridine 200 MG Oral Tab Take 1 tablet (200 mg total) by mouth 3 (three) times daily as needed for Pain.  Qty: 6 tablet, Refills: 0                   Supplementary Documentation:

## 2025-06-09 NOTE — ED INITIAL ASSESSMENT (HPI)
Patient concerned for uti.  Patient with lower abdominal pressure with voiding and orange urine since yesterday.

## 2025-06-10 ENCOUNTER — MED REC SCAN ONLY (OUTPATIENT)
Dept: NEUROLOGY | Facility: CLINIC | Age: 70
End: 2025-06-10

## 2025-06-10 ENCOUNTER — OFFICE VISIT (OUTPATIENT)
Dept: NEUROLOGY | Facility: CLINIC | Age: 70
End: 2025-06-10
Payer: MEDICARE

## 2025-06-10 DIAGNOSIS — G25.0 BENIGN ESSENTIAL TREMOR: Primary | ICD-10-CM

## 2025-06-10 PROCEDURE — G2211 COMPLEX E/M VISIT ADD ON: HCPCS | Performed by: INTERNAL MEDICINE

## 2025-06-10 PROCEDURE — 99204 OFFICE O/P NEW MOD 45 MIN: CPT | Performed by: INTERNAL MEDICINE

## 2025-06-10 RX ORDER — PROPRANOLOL HYDROCHLORIDE 10 MG/1
10 TABLET ORAL 3 TIMES DAILY
Qty: 30 TABLET | Refills: 1 | Status: SHIPPED | OUTPATIENT
Start: 2025-06-10

## 2025-06-10 NOTE — PROGRESS NOTES
[facility] [address], [address]  [facility] [address]  [phone]            Neurology Initial Visit     Referred By: Dr. Martines    Chief Complaint:   Chief Complaint   Patient presents with    Neurologic Problem     Patient presents here today to establish care, patient was referred by Dr. Martines to evaluate and treat tremors and memory changes. Patient c/o slight tremors in head x 3 year. Patient states she has some difficultly recall things but later on recalls it.   Patient gives verbal consent to use [employer].        History of Present Illness  Manda Patel is a 70 year old female who presents with worsening tremors.    She has experienced head tremors for a long time, which have progressively worsened. Initially, the tremors were infrequent and primarily noticeable when holding a cup or glass, especially after consuming alcohol. Recently, the tremors have become more pronounced, occurring even when she is calm and sitting.  It worsens if she is trying to be still, like for an eye exam or scanning of her neck.  Mostly the tremors are in her head, but she does notice milder tremors in the hands occasionally.  Has no issues with eating or drinking.  Walking and balance are unaffected.  She reports occasional neck pain, particularly on the right side, which she attributes to her sleeping position on her belly. She has tried changing her sleeping position and doing exercises to alleviate the pain. No significant pain during the day but notes some discomfort when performing certain exercises.    She reports memory issues, describing difficulty recalling simple things, such as family names or brands, and sometimes needing to look up information. She is concerned about potential dementia, as her son has raised concern about her memory. She sometimes feels a sensation in her head, described as 'inside dizzy,' but not like traditional dizziness.  Mainly sees this when she  is changing positions, going from sitting to standing.  She reports having low blood pressure.    She experiences poor sleep, often waking up multiple times during the night and having difficulty falling back asleep. She sometimes uses a sleep aid from [employer], taking a half or third of a tablet, which she finds effective. She also drinks chamomile tea nightly to aid sleep.          Past Medical History[1]    Past Surgical History[2]    Social history:  History   Smoking Status    Never   Smokeless Tobacco    Never     History   Alcohol Use No     History   Drug Use No       Family History[3]    Medications - Current[4]    Allergies[5]    ROS:   As in HPI, the rest of the 14 system review was done and was negative      Physical Exam:  There were no vitals filed for this visit.    General: No apparent distress, well nourished, well groomed.  Head- Normocephalic, atraumatic  Eyes- No redness or swelling    Neurological:   Mental Status:  Mental Status- Alert, conversant, speech fluent, following all commands  MoCA 26/30, 4/5 delayed recall    Cranial Nerves:  II.- Visual fields full to confrontation, III, IV, VI- EOM intact, and VII. Face symmetric, no facial weakness    Motor Exam:  Strength- upper extremities 5/5 proximally and distally                - lower  extremities 5/5 proximally and distally    Sensory Exam:  Light touch- intact in all 4 extremities    Deep Tendon Reflexes:  Biceps 2+ bilateral symmetric  Triceps 2+ bilateral symmetric  Brachioradialis 2 + bilateral symmetric  Patellar 2+ bilateral symmetric  Ankle jerks 2+ bilateral symmetric    Coordination:  No dysmetria with finger to nose bilaterally  Very faint high frequency tremor L>R UE  Mild no-no head tremor, intermittent  No chin tilt    Gait:  Normal casual gait    Labs:    Lab Results   Component Value Date    TSH 1.855 04/16/2025     Lab Results   Component Value Date    HDL 73 (H) 06/04/2025     (H) 06/04/2025    TRIG 89 06/04/2025      Lab Results   Component Value Date    HGB 13.4 04/16/2025    HCT 40.9 04/16/2025    MCV 91.5 04/16/2025    WBC 5.9 04/16/2025    .0 04/16/2025      Lab Results   Component Value Date    BUN 22 03/27/2025    CA 9.1 03/27/2025    ALT 11 03/27/2025    AST 18 03/27/2025    ALB 4.3 03/27/2025     03/27/2025    K 4.5 03/27/2025     03/27/2025    CO2 28.0 03/27/2025      I have reviewed labs.    Imaging Studies:  I have independently reviewed imaging.      Assessment & Plan  Essential tremor  Mild essential tremor affecting head and hands, exacerbated by stress and activities. Differential diagnosis includes cervical dystonia, but not supported by examination.   - Prescribed propranolol 10 mg as needed for tremor reduction in specific situations.  - Discussed hypotension concerns.    Memory concerns  Intermittent memory lapses suggest focus and attention issues, not true memory loss. Did well on MoCA with strong delayed recall which is very reassuring.  Poor sleep may contribute to memory issues.  - Monitor memory function, consider further testing if symptoms worsen.  - Discussed strategies to promote brain health including continuing to have set routines, good sleep at night, maintain social connections, preserve hearing, control cardiovascular risk factors       Insomnia  Chronic insomnia with difficulty maintaining sleep, possibly due to pain and stress. Current use of OTC sleep aid with antihistamine is safe. Poor sleep may increase memory risk with aging.  - Continue current sleep aid as needed.  - Encouraged good sleep hygiene, including chamomile tea before bed.         Education and counseling provided to patient. Instructed patient to call my office or seek medical attention immediately if symptoms worsen.  Patient verbalized understanding of information given. All questions were answered. All side effects of drugs were discussed.     I have spent 57 min total time on the day of the  encounter, including: Total time spent reasons:  Preparing to see the patient, Obtaining and/or reviewing separately obtained history, Performing a medically appropriate examination and/or evaluation, Counseling and educating the patient/family/caregiver, Ordering medications, tests, or procedures, Documenting clinical information in Epic, and Independently interpreting results and communicating results to the patient/family/caregiver      Return to clinic in: Return in about 4 months (around 10/10/2025).    Edith Mansfield MD           [1]   Past Medical History:   Colon polyp    Constipation    Deep vein thrombosis (HCC)    3+ yrs ago after 2018 sx on leg    GERD (gastroesophageal reflux disease)    Hemorrhoids    Hiatal hernia    High cholesterol    Renal disorder    Visual impairment    reading glassess   [2]   Past Surgical History:  Procedure Laterality Date    Colonoscopy      [facility]    Colonoscopy N/A 2019    Procedure: COLONOSCOPY;  Surgeon: Edd Rod MD;  Location: [facility] ENDOSCOPY    Colonoscopy N/A 3/6/2025    Procedure: COLONOSCOPY;  Surgeon: Edd Rod MD;  Location: [facility] MAIN OR    Egd      [facility]    Hysterectomy      fibroid          Other surgical history  1972    one ovary removed/ cyst   [3]   Family History  Problem Relation Age of Onset    Cancer Father 83        Colon    Cancer Mother 69        Breast    Breast Cancer Mother 65    Other (Other) Brother 68        epilepsy    Cancer Maternal Aunt     Cancer Maternal Aunt     Blood Clotting Disorder Neg    [4]   Current Outpatient Medications:     propranolol 10 MG Oral Tab, Take 1 tablet (10 mg total) by mouth 3 (three) times daily., Disp: 30 tablet, Rfl: 1    sulfamethoxazole-trimethoprim -160 MG Oral Tab per tablet, Take 1 tablet by mouth 2 (two) times daily for 3 days., Disp: 6 tablet, Rfl: 0    phenazopyridine 200 MG Oral Tab, Take 1 tablet (200 mg total) by mouth 3 (three)  times daily as needed for Pain., Disp: 6 tablet, Rfl: 0    alendronate (FOSAMAX) 70 MG Oral Tab, Take 1 tablet (70 mg total) by mouth once a week., Disp: 12 tablet, Rfl: 3    rivaroxaban 10 MG Oral Tab, Take 1 tablet (10 mg total) by mouth daily with food., Disp: 90 tablet, Rfl: 3  [5]   Allergies  Allergen Reactions    Penicillins RASH    Streptomycin RASH    Other      levomicetine

## 2025-06-10 NOTE — PROGRESS NOTES
The following individual(s) verbally consented to be recorded using ambient AI listening technology and understand that they can each withdraw their consent to this listening technology at any point by asking the clinician to turn off or pause the recording:    Patient name: Manda Patel  Additional names:

## 2025-06-14 ENCOUNTER — HOSPITAL ENCOUNTER (OUTPATIENT)
Dept: CV DIAGNOSTICS | Facility: HOSPITAL | Age: 70
Discharge: HOME | End: 2025-06-14
Attending: FAMILY MEDICINE
Payer: MEDICARE

## 2025-06-14 DIAGNOSIS — I31.39 PERICARDIAL EFFUSION (HCC): ICD-10-CM

## 2025-06-14 PROCEDURE — 93306 TTE W/DOPPLER COMPLETE: CPT | Performed by: FAMILY MEDICINE

## 2025-06-23 ENCOUNTER — TELEPHONE (OUTPATIENT)
Dept: FAMILY MEDICINE CLINIC | Facility: CLINIC | Age: 70
End: 2025-06-23

## 2025-06-23 NOTE — TELEPHONE ENCOUNTER
Patient (name and  verified) calling to ask for a urine test order for an ER follow up and having some additional symptoms that she would like checked out.    Assisted patient with appt scheduling, verbalized understanding and agrees to plan.     Future Appointments   Date Time Provider Department Center   2025  2:30 PM Connie Summers APRN [facility]   2025  8:00 AM El Bowers MD [facility]

## 2025-06-24 ENCOUNTER — OFFICE VISIT (OUTPATIENT)
Dept: FAMILY MEDICINE CLINIC | Facility: CLINIC | Age: 70
End: 2025-06-24
Payer: MEDICARE

## 2025-06-24 VITALS
OXYGEN SATURATION: 97 % | HEART RATE: 68 BPM | BODY MASS INDEX: 27.47 KG/M2 | SYSTOLIC BLOOD PRESSURE: 128 MMHG | WEIGHT: 175 LBS | HEIGHT: 67 IN | DIASTOLIC BLOOD PRESSURE: 83 MMHG

## 2025-06-24 DIAGNOSIS — R30.0 DYSURIA: ICD-10-CM

## 2025-06-24 DIAGNOSIS — Q27.2: ICD-10-CM

## 2025-06-24 DIAGNOSIS — N30.01 ACUTE CYSTITIS WITH HEMATURIA: Primary | ICD-10-CM

## 2025-06-24 LAB
APPEARANCE: CLEAR
BILIRUBIN: NEGATIVE
GLUCOSE (URINE DIPSTICK): NEGATIVE MG/DL
KETONES (URINE DIPSTICK): NEGATIVE MG/DL
MULTISTIX LOT#: ABNORMAL NUMERIC
NITRITE, URINE: NEGATIVE
PH, URINE: 6 (ref 4.5–8)
PROTEIN (URINE DIPSTICK): NEGATIVE MG/DL
SPECIFIC GRAVITY: 1.01 (ref 1–1.03)
URINE-COLOR: YELLOW
UROBILINOGEN,SEMI-QN: 0.2 MG/DL (ref 0–1.9)

## 2025-06-24 PROCEDURE — 99214 OFFICE O/P EST MOD 30 MIN: CPT

## 2025-06-24 PROCEDURE — 81002 URINALYSIS NONAUTO W/O SCOPE: CPT

## 2025-06-24 RX ORDER — NITROFURANTOIN 25; 75 MG/1; MG/1
100 CAPSULE ORAL 2 TIMES DAILY
Qty: 14 CAPSULE | Refills: 0 | Status: SHIPPED | OUTPATIENT
Start: 2025-06-24

## 2025-06-24 NOTE — PROGRESS NOTES
Subjective:   Manda Patel is a 70 year old female who presents for Low Back Pain (Pt states she has she has been having back pain and want to get her urine tested again since she had glucose in urine. ).     HPI   Patient presents with complains of  burning with urination, dysuria, bilateral flank pain, frequency, hesitancy, incomplete bladder emptying, pain int lower abdomen, suprapubic pressure, and urgency. She also complains of back pain. Symptoms started around 2 days ago.  She does not have a fever and has not witnessed some blood in the urine. She denies congestion, cough, fever, headache, rhinitis, sorethroat, stomach ache, and vaginal discharge.   Patient has not had intercourse in the last week.  She has a past history of urinary tract or kidney infections.      History/Other:      Chief Complaint Reviewed and Verified  Nursing Notes Reviewed and   Verified  Tobacco Reviewed  Allergies Reviewed  Medications Reviewed    Problem List Reviewed  Medical History Reviewed  Surgical History   Reviewed  OB Status Reviewed  Family History Reviewed  Social History   Reviewed           Tobacco:  She has never smoked tobacco.      Current Medications[1]    Allergies[2]      Review of Systems   Constitutional: Negative.  Negative for activity change and fatigue.   HENT: Negative.  Negative for congestion, ear pain, rhinorrhea and sneezing.    Eyes: Negative.  Negative for redness.   Respiratory: Negative.  Negative for cough, shortness of breath and wheezing.    Cardiovascular: Negative.  Negative for chest pain.   Gastrointestinal: Negative.  Negative for abdominal pain, constipation, diarrhea, nausea and vomiting.   Endocrine: Negative.    Genitourinary:  Positive for difficulty urinating, dysuria, flank pain, frequency, pelvic pain and urgency.   Musculoskeletal:  Positive for back pain. Negative for joint swelling and myalgias.   Skin: Negative.  Negative for rash.   Allergic/Immunologic: Negative.     Neurological: Negative.  Negative for dizziness, syncope, light-headedness and headaches.   Hematological: Negative.    Psychiatric/Behavioral: Negative.           Objective:   /83 (BP Location: Right arm, Patient Position: Sitting, Cuff Size: adult)   Pulse 68   Ht 5' 7\" (1.702 m)   Wt 175 lb (79.4 kg)   SpO2 97%   BMI 27.41 kg/m²  Estimated body mass index is 27.41 kg/m² as calculated from the following:    Height as of this encounter: 5' 7\" (1.702 m).    Weight as of this encounter: 175 lb (79.4 kg).      Physical Exam  Vitals and nursing note reviewed.   Constitutional:       Appearance: Normal appearance. She is normal weight.   HENT:      Head: Normocephalic and atraumatic.      Right Ear: Tympanic membrane normal.      Left Ear: Tympanic membrane normal.      Nose: Nose normal.      Mouth/Throat:      Mouth: Mucous membranes are moist.      Pharynx: Oropharynx is clear.   Eyes:      Extraocular Movements: Extraocular movements intact.      Conjunctiva/sclera: Conjunctivae normal.      Pupils: Pupils are equal, round, and reactive to light.   Cardiovascular:      Rate and Rhythm: Normal rate and regular rhythm.      Pulses: Normal pulses.      Heart sounds: Normal heart sounds.   Pulmonary:      Effort: Pulmonary effort is normal.      Breath sounds: Normal breath sounds.   Abdominal:      General: Abdomen is flat. Bowel sounds are normal.      Palpations: Abdomen is soft.   Musculoskeletal:         General: Normal range of motion.      Cervical back: Normal range of motion and neck supple.   Skin:     General: Skin is warm and dry.   Neurological:      General: No focal deficit present.      Mental Status: She is alert and oriented to person, place, and time. Mental status is at baseline.   Psychiatric:         Mood and Affect: Mood normal.         Behavior: Behavior normal.         Thought Content: Thought content normal.         Judgment: Judgment normal.           Assessment & Plan:      Assessment & Plan  Acute cystitis with hematuria  -Will start on antibiotics as directed.  -Encouraged patient on increased fluids and discussed about incorporating cranberry juice.  -Patient educated about women's probiotics.  -Educated about voiding before and after sexual activity and using pH balanced soap.  -Urine culture was sent and will follow up after results received.   Orders:    Urine Culture, Routine; Future    nitrofurantoin monohydrate macro (MACROBID) 100 MG Oral Cap; Take 1 capsule (100 mg total) by mouth 2 (two) times daily.    Other congenital malformations of renal artery (HCC)  -Patient requesting referral for nephrologist.     Orders:    Nephrology Referral - In Network    Dysuria    Orders:    URINALYSIS NONAUTO W/O SCOPE       Medication use, effects and side effects discussed in detail with patient. The patient indicated understanding of the diagnosis and agreed with the plan of care.    Return if symptoms worsen or fail to improve.    SILVINA Tang       [1]   Current Outpatient Medications   Medication Sig Dispense Refill    nitrofurantoin monohydrate macro (MACROBID) 100 MG Oral Cap Take 1 capsule (100 mg total) by mouth 2 (two) times daily. 14 capsule 0    propranolol 10 MG Oral Tab Take 1 tablet (10 mg total) by mouth 3 (three) times daily. 30 tablet 1    alendronate (FOSAMAX) 70 MG Oral Tab Take 1 tablet (70 mg total) by mouth once a week. 12 tablet 3    rivaroxaban 10 MG Oral Tab Take 1 tablet (10 mg total) by mouth daily with food. 90 tablet 3   [2]   Allergies  Allergen Reactions    Penicillins RASH    Streptomycin RASH    Other      levomicetine

## 2025-07-01 ENCOUNTER — CLINICAL DOCUMENTATION (OUTPATIENT)
Dept: OBGYN | Age: 70
End: 2025-07-01

## 2025-07-22 ENCOUNTER — OFFICE VISIT (OUTPATIENT)
Dept: DERMATOLOGY CLINIC | Facility: CLINIC | Age: 70
End: 2025-07-22
Payer: MEDICARE

## 2025-07-22 DIAGNOSIS — D18.01 CHERRY ANGIOMA: ICD-10-CM

## 2025-07-22 DIAGNOSIS — L82.0 INFLAMED SEBORRHEIC KERATOSIS: ICD-10-CM

## 2025-07-22 DIAGNOSIS — L82.1 SEBORRHEIC KERATOSES: Primary | ICD-10-CM

## 2025-07-22 PROCEDURE — 17111 DESTRUCTION B9 LESIONS 15/>: CPT | Performed by: STUDENT IN AN ORGANIZED HEALTH CARE EDUCATION/TRAINING PROGRAM

## 2025-07-22 PROCEDURE — 99214 OFFICE O/P EST MOD 30 MIN: CPT | Performed by: STUDENT IN AN ORGANIZED HEALTH CARE EDUCATION/TRAINING PROGRAM

## 2025-07-22 NOTE — PROGRESS NOTES
Established patient     Referred by:   No referring provider defined for this encounter.     CHIEF COMPLAINT: Actinic kearatosis    HISTORY OF PRESENT ILLNESS: .    1. Actinic keratosis  Location: Body   Duration: Chronic  Bleeding, growing, changing?: No  Scaly?:No    Itchy?:No    Current treatment: Cryotherapy  Past treatments: Cryotherapy    DERM HISTORY:  History of skin cancer: No  History of chronic skin disease/condition: No    FAMILY HISTORY:  History of melanoma: No    History/Other:    REVIEW OF SYSTEMS:  Constitutional: Denies fever, chills, unintentional weight loss.   Skin as per HPI    PAST MEDICAL HISTORY:  Past Medical History[1]    Medications  Current Medications[2]    Objective:    PHYSICAL EXAM:  General: awake, alert, no acute distress  Skin: Skin exam was performed today including the following: Abdomen. Pertinent findings include:   - Abdomen with inflamed brown stuck on papules   - Post neck with red cherry papules   - Scalp with a regular skin colored papule    ASSESSMENT & PLAN:  Pathophysiology of diagnoses discussed with patient.  Therapeutic options reviewed. Risks, benefits, and alternatives discussed with patient. Instructions reviewed at length.    #Cherry angiomas  - Reassured regarding benign nature of lesions and likelihood of developing new lesions as time passes   _ can consider PDL in future. Would recommend Dr. Gutierres.     #Multiple benign nevi  - Reassured patient of benign nature of these lesions.   - Return for lesions that are growing, changing or symptomatic.   - Recommend daily photoprotection with broad-spectrum sunscreen, avoidance of sun during peak hours, and sun protective clothing.   - Dermoscopy was used for physical examination of pigmented lesions during today's office visit.    #Seborrheic Keratoses  - Reassured patient regarding benign nature of lesions. No treatment but observation at this time. Follow-up for concerning physical changes or new  symptoms.    #Inflamed seborrheic keratosis  - Reassured regarding benign nature of lesion   - Cryotherapy today. Medically necessary as lesion inflamed and irritated.    - Cryosurgery of non-malignant lesion(s)  - Risks, benefits, alternatives and personnel required for cryosurgery reviewed with patient. Pt verbalizes understanding and wishes to proceed.   - Cryosurgery performed with Liquid Nitrogen via cryostat spray gun to ISK. >20 lesion(s) treated.   - Patient tolerated well and wound care discussed.     Return to clinic: 6 months or sooner if something concerning arises     El Bowers MD    By signing my name below, Susannah DAVID MA,  attest that this documentation has been prepared under the direction and in the presence of El Bowers MD.   Electronically Signed: Susannah JOHNSON MA, 7/22/2025, 8:05 AM.    IEl MD,  personally performed the services described in this documentation. All medical record entries made by the scribe were at my direction and in my presence.  I have reviewed the chart and agree that the record reflects my personal performance and is accurate and complete.  El Bowers MD, 7/22/2025, 9:26 AM         [1]   Past Medical History:   Colon polyp    Constipation    Deep vein thrombosis (HCC)    3+ yrs ago after 2018 sx on leg    GERD (gastroesophageal reflux disease)    Hemorrhoids    Hiatal hernia    High cholesterol    Renal disorder    Visual impairment    reading glassess   [2]   Current Outpatient Medications   Medication Sig Dispense Refill    nitrofurantoin monohydrate macro (MACROBID) 100 MG Oral Cap Take 1 capsule (100 mg total) by mouth 2 (two) times daily. 14 capsule 0    propranolol 10 MG Oral Tab Take 1 tablet (10 mg total) by mouth 3 (three) times daily. 30 tablet 1    alendronate (FOSAMAX) 70 MG Oral Tab Take 1 tablet (70 mg total) by mouth once a week. 12 tablet 3    rivaroxaban 10 MG Oral Tab Take 1 tablet (10 mg total) by mouth daily with food. 90  tablet 3

## 2025-08-07 ENCOUNTER — OFFICE VISIT (OUTPATIENT)
Facility: CLINIC | Age: 70
End: 2025-08-07

## 2025-08-07 VITALS
HEART RATE: 71 BPM | WEIGHT: 175 LBS | DIASTOLIC BLOOD PRESSURE: 74 MMHG | BODY MASS INDEX: 27 KG/M2 | SYSTOLIC BLOOD PRESSURE: 109 MMHG

## 2025-08-07 DIAGNOSIS — N39.0 RECURRENT UTI: Primary | ICD-10-CM

## 2025-08-07 DIAGNOSIS — R10.9 FLANK PAIN: ICD-10-CM

## 2025-08-07 DIAGNOSIS — R93.41 ABNORMAL RADIOLOGIC FINDINGS ON DIAGNOSTIC IMAGING OF RENAL PELVIS, URETER, OR BLADDER: ICD-10-CM

## 2025-08-07 PROCEDURE — 99204 OFFICE O/P NEW MOD 45 MIN: CPT | Performed by: INTERNAL MEDICINE

## 2025-08-08 ENCOUNTER — LAB ENCOUNTER (OUTPATIENT)
Dept: LAB | Age: 70
End: 2025-08-08
Attending: INTERNAL MEDICINE

## 2025-08-08 DIAGNOSIS — R10.9 FLANK PAIN: ICD-10-CM

## 2025-08-08 LAB
BILIRUB UR QL: NEGATIVE
CLARITY UR: CLEAR
GLUCOSE UR-MCNC: NORMAL MG/DL
HGB UR QL STRIP.AUTO: NEGATIVE
KETONES UR-MCNC: NEGATIVE MG/DL
LEUKOCYTE ESTERASE UR QL STRIP.AUTO: NEGATIVE
NITRITE UR QL STRIP.AUTO: NEGATIVE
PH UR: 6 (ref 5–8)
PROT UR-MCNC: NEGATIVE MG/DL
SP GR UR STRIP: 1.02 (ref 1–1.03)
UROBILINOGEN UR STRIP-ACNC: NORMAL

## 2025-08-08 PROCEDURE — 87086 URINE CULTURE/COLONY COUNT: CPT

## 2025-08-08 PROCEDURE — 81003 URINALYSIS AUTO W/O SCOPE: CPT

## 2025-08-13 ENCOUNTER — HOSPITAL ENCOUNTER (OUTPATIENT)
Dept: ULTRASOUND IMAGING | Facility: HOSPITAL | Age: 70
Discharge: HOME | End: 2025-08-13
Attending: INTERNAL MEDICINE

## 2025-08-13 DIAGNOSIS — R93.41 ABNORMAL RADIOLOGIC FINDINGS ON DIAGNOSTIC IMAGING OF RENAL PELVIS, URETER, OR BLADDER: ICD-10-CM

## 2025-08-13 PROCEDURE — 76770 US EXAM ABDO BACK WALL COMP: CPT | Performed by: INTERNAL MEDICINE

## 2025-08-13 RX ORDER — ALENDRONATE SODIUM 70 MG/1
70 TABLET ORAL
Qty: 12 TABLET | Refills: 3 | Status: SHIPPED | OUTPATIENT
Start: 2025-08-13

## 2025-08-27 ENCOUNTER — CLINICAL DOCUMENTATION (OUTPATIENT)
Dept: OBGYN | Age: 70
End: 2025-08-27

## 2025-08-27 SDOH — ECONOMIC STABILITY: HOUSING INSECURITY: WHAT IS YOUR LIVING SITUATION TODAY?: I HAVE A STEADY PLACE TO LIVE

## 2025-08-27 SDOH — ECONOMIC STABILITY: FOOD INSECURITY: WITHIN THE PAST 12 MONTHS, THE FOOD YOU BOUGHT JUST DIDN'T LAST AND YOU DIDN'T HAVE MONEY TO GET MORE.: NEVER TRUE

## 2025-08-27 SDOH — ECONOMIC STABILITY: HOUSING INSECURITY: DO YOU HAVE PROBLEMS WITH ANY OF THE FOLLOWING?: NONE OF THE ABOVE

## 2025-08-27 SDOH — ECONOMIC STABILITY: TRANSPORTATION INSECURITY
IN THE PAST 12 MONTHS, HAS LACK OF RELIABLE TRANSPORTATION KEPT YOU FROM MEDICAL APPOINTMENTS, MEETINGS, WORK OR FROM GETTING THINGS NEEDED FOR DAILY LIVING?: NO

## 2025-08-27 ASSESSMENT — SOCIAL DETERMINANTS OF HEALTH (SDOH): IN THE PAST 12 MONTHS, HAS THE ELECTRIC, GAS, OIL, OR WATER COMPANY THREATENED TO SHUT OFF SERVICE IN YOUR HOME?: NO

## 2025-08-28 ENCOUNTER — APPOINTMENT (OUTPATIENT)
Dept: OBGYN | Age: 70
End: 2025-08-28

## 2025-08-28 VITALS
OXYGEN SATURATION: 97 % | TEMPERATURE: 98 F | SYSTOLIC BLOOD PRESSURE: 107 MMHG | HEIGHT: 68 IN | DIASTOLIC BLOOD PRESSURE: 75 MMHG | HEART RATE: 75 BPM | BODY MASS INDEX: 26.67 KG/M2 | WEIGHT: 176 LBS

## 2025-08-28 DIAGNOSIS — Z01.419 ENCOUNTER FOR ROUTINE GYNECOLOGIC EXAMINATION IN MEDICARE PATIENT: Primary | ICD-10-CM

## 2025-08-28 SDOH — HEALTH STABILITY: MENTAL HEALTH

## 2025-08-28 ASSESSMENT — PATIENT HEALTH QUESTIONNAIRE - PHQ9
1. LITTLE INTEREST OR PLEASURE IN DOING THINGS: NOT AT ALL
2. FEELING DOWN, DEPRESSED OR HOPELESS: NOT AT ALL
SUM OF ALL RESPONSES TO PHQ9 QUESTIONS 1 AND 2: 0
SUM OF ALL RESPONSES TO PHQ9 QUESTIONS 1 AND 2: 0

## (undated) DEVICE — LINE MNTR ADLT SET O2 INTMD

## (undated) DEVICE — STERILE LATEX POWDER-FREE SURGICAL GLOVESWITH NITRILE COATING: Brand: PROTEXIS

## (undated) DEVICE — 35 ML SYRINGE REGULAR TIP: Brand: MONOJECT

## (undated) DEVICE — FORCEP RADIAL JAW 4

## (undated) DEVICE — MEDI-VAC NON-CONDUCTIVE SUCTION TUBING 6MM X 1.8M (6FT.) L: Brand: CARDINAL HEALTH

## (undated) DEVICE — Device: Brand: DEFENDO AIR/WATER/SUCTION AND BIOPSY VALVE

## (undated) DEVICE — Device: Brand: CUSTOM PROCEDURE KIT

## (undated) NOTE — LETTER
AUTHORIZATION FOR SURGICAL OPERATION OR OTHER PROCEDURE    1. I hereby authorize Dr. Renetta Barnes  and [place] [employer] [facility] [facility] staff assigned to my case to perform the following operation and/or procedure at the [facility], [facility]:    Total matrixectomy on Left hallux   _______________________________________________________________________________________________      _______________________________________________________________________________________________    2. My physician has explained the nature and purpose of the operation or other procedure, possible alternative methods of treatment, the risks involved, and the possibility of complication to me. I acknowledge that no guarantee has been made as to the result that may be obtained. 3.  I recognize that, during the course of this operation, or other procedure, unforseen conditions may necessitate additional or different procedure than those listed above. I, therefore, further authorize and request that the above named physician, his/her physician assistants or designees perform such procedures as are, in his/her professional opinion, necessary and desirable. 4.  Any tissue or organs removed in the operation or other procedure may be disposed of by and at the discretion of the [facility], [facility] and [facility]. 5.  I understand that in the event of a medical emergency, I will be transported by local paramedics to [facility] or other hospital emergency department. 6.  I certify that I have read and fully understand the above consent to operation and/or other procedure. 7.  I acknowledge that my physician has explained sedation/analgesia administration to me including the risks and benefits. I consent to the administration of sedation/analgesia as may be necessary or desirable in the judgement of my physician.     Witness signature: ___________________________________________________ Date:  ______/______/_____ Time:  ________ A. M.  P.M. Patient Name:  ______________________________________________________  (please print)      Patient signature:  ___________________________________________________             Relationship to Patient:           []  Parent    Responsible person                          []  Spouse  In case of minor or                    [] Other  _____________   Incompetent name:  __________________________________________________                               (please print)      _____________      Responsible person  In case of minor or  Incompetent signature:  _______________________________________________    Statement of Physician  My signature below affirms that prior to the time of the procedure, I have explained to the patient and/or his/her guardian, the risks and benefits involved in the proposed treatment and any reasonable alternative to the proposed treatment. I have also explained the risks and benefits involved in the refusal of the proposed treatment and have answered the patient's questions.                         Date:  ______/______/_______  Provider                      Signature:  __________________________________________________________       Time:  ___________ A.M    P.M.

## (undated) NOTE — LETTER
1/5/2020              MANJIT/Ank[address]         Dear Ms. Estela Gallo recent colonoscopy exam at [facility] on 12/24/2019 showed mild diverticulosis of the colon and a single small colon poly

## (undated) NOTE — LETTER
10/22/2024    Manda Patel        [address] [facility] [address]            Dear Manda Patel,      Our records indicate that you are due for an appointment for a Colonoscopy with Edd Rod MD. Our doctors are booking out about 3-6 months in advance for procedures.     Please call our office to schedule a phone screening appointment to plan for the procedure(s).   Your medical well-being is important to us.    If your insurance requires a referral, please call your primary care office to request one.      Thank you,      The Physicians and Staff at [facility]

## (undated) NOTE — LETTER
[address] [facility]    Consent for Coronary CT Angiography    Your doctor has recommended that you have a Coronary CT Angiography procedure.  Coronary CT Angiography is a diagnostic procedure using computed tomography to scan the can range from very minor to very serious leading to a life threatening situation or even death. Please be sure to communicate any allergy you may have to your caregiver immediately.     The information that is obtained during your testing will be treated a

## (undated) NOTE — LETTER
[address] [place] [address]  Autorizacion para Procedimientos Invasivos    1.  Por el presente, autorizo al doctor ***, a mi(s) médico(s) y a Supriya Begun designado bi asistente del doctor, a realizar la siguiente operación y/o procedimie cuidadosos análisis y exámenes de Nydia Ovalles y hemoderivados, aún puedo Mona Hidden a efectos adversos bi resultado de andry transfusión de Nydia Bravoalam y/o hemoderivados.  Los siguientes son algunos, no todos, de los potenciales riesgos: fiebre y reacciones alér procedimiento. 11. Reconozco que mi médico me ha explicado la administración de la sedación/analgesia, incluidos shanta riesgos y beneficios.  Acepto la administración de sedación/analgesia según sea necesaria o conveniente a juicio de mi médico.       _____ Page 1 of 1